# Patient Record
Sex: MALE | Race: WHITE | NOT HISPANIC OR LATINO | Employment: FULL TIME | URBAN - METROPOLITAN AREA
[De-identification: names, ages, dates, MRNs, and addresses within clinical notes are randomized per-mention and may not be internally consistent; named-entity substitution may affect disease eponyms.]

---
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
## 2021-01-15 ENCOUNTER — VIRTUAL VISIT (OUTPATIENT)
Dept: ENDOCRINOLOGY | Facility: CLINIC | Age: 21
End: 2021-01-15
Payer: COMMERCIAL

## 2021-01-15 VITALS — BODY MASS INDEX: 26.74 KG/M2 | WEIGHT: 175 LBS

## 2021-01-15 DIAGNOSIS — E06.3 HYPOTHYROIDISM DUE TO HASHIMOTO'S THYROIDITIS: Primary | ICD-10-CM

## 2021-01-15 PROCEDURE — 99214 OFFICE O/P EST MOD 30 MIN: CPT | Mod: 95 | Performed by: NURSE PRACTITIONER

## 2021-01-15 NOTE — PATIENT INSTRUCTIONS
Thank you for choosing [facility]. It was a pleasure to see you for your office visit today.     If you have any questions or scheduling needs during regular office hours, please call our [place] clinic: [phone]   If urgent concerns arise after hours, you can call [phone] and ask to speak to the pediatric specialist on call.   If you need to schedule Radiology tests, please call: [phone]  My Chart messages are for routine communication and questions and are usually answered within 48-72 hours. If you have an urgent concern or require sooner response, please call us.  Outside lab and imaging results should be faxed to [phone].  If you go to a lab outside of [facility] we will not automatically get those results. You will need to ask to have them faxed.       If you had any blood work, imaging or other tests completed today:  Normal test results will be mailed to your home address in a letter.  Abnormal results will be communicated to you via phone call/letter.  Please allow up to 1-2 weeks for processing and interpretation of most lab work.    1.  No specific concerns on present levothyroxine dosage.    2.  I recommend thyroid lab check in 1-4 weeks with lab appointment.   3.  Endocrine follow up in 1 year.  4.  Contact me with any concerns prior to next visit.

## 2021-01-15 NOTE — PROGRESS NOTES
Edmund is a 20 year old who is being evaluated via a billable video visit.      How would you like to obtain your AVS? Mail a copy  If the video visit is dropped, the invitation should be resent by: Text to cell phone: [phone]  Will anyone else be joining your video visit? No    Video Start Time: 9:19 am  Video-Visit Details    Type of service:  Video Visit    Video End Time:9:26 am    Originating Location (pt. Location): Home    Distant Location (provider location):  [facility] [place]     Platform used for Video Visit: [facility]          Pediatric Endocrinology Follow Up Consultation    Patient: Edmund Beasley MRN# [id number]   YOB: 2000 Age: 20 year old   Date of Visit: Bud 15, 2021    Dear Referring Provider:    I had the pleasure of seeing your patient, Edmund Beasley in the Pediatric Endocrinology Clinic, [facility], on Bud 15, 2021 for follow up consultation regarding abnormal thyroid function testing.           Problem list:     Patient Active Problem List    Diagnosis Date Noted     Intermittent asthma 08/07/2014     Priority: Medium     Was more persistent in fall/winter 2013 - used Symbicort and needed oral steroids for a couple of months        AR (allergic rhinitis) 11/19/2013     Priority: Medium            HPI:   Edmund is a 20 year old  male participating in a video visit in follow up regarding Hashimoto's hypothyroidism.  Edmund was last seen in endocrine clinic virtually on 3/17/2020.      Previous history is reviewed:  Edmund was in for concerns of heart palpitations on 2/19/2019.   Holter monitor placed and normal.  He had thyroid labs performed which showed a suppressed TSH and mildly elevated Free T4 of 1.47.  Also noted muscle twitching at time.  We repeated thyroid labs at initial consultation with thyroid antibodies and Edmund's TSH had risen further to 9.24 with a mildly low Free T4 of 0.75.  Thyroid antibodies were positive  indicative of Hashimoto's hypothyroidism as cause.     Based on results, treatment with levothyroxine was recommend at 75 mcg daily.    Current history:  Edmund reports being well since our last visit.  No issues with sleep.  Sometimes tired but not persistently fatigued.  No abdominal pain, diarrhea, constipation.  No excessive dry skin.  No cold intolerance.  He has noted weight gain since last visit but attributes to eating more with Covid 19 pandemic.      I have reviewed the available past laboratory evaluations, imaging studies, and medical records available to me at this visit.     History was obtained from patient, and electronic health record.           Past Medical History:     Past Medical History:   Diagnosis Date     Moderate persistent asthma 11/19/2013     Unspecified asthma(493.90)             Past Surgical History:   No past surgical history on file.            Social History:     Social History     Social History Narrative    Graduated high school spring 2020.  Currently working.  Edmund lives at home with his father, step-mother, step grandmother, and 2 sisters.        Reviewed and as above.         Family History:   Father is  5 feet 9 inches tall.  Mother is  5 feet tall.       No family history on file.    History of:  Adrenal insufficiency: none.  Autoimmune disease: none.  Calcium problems: none.  Delayed puberty: none.  Diabetes mellitus: none.  Early puberty: none.  Genetic disease: none.  Short stature: none.  Thyroid disease: none.         Allergies:   No Known Allergies          Medications:     Current Outpatient Medications   Medication Sig Dispense Refill     levothyroxine (SYNTHROID/LEVOTHROID) 50 MCG tablet Take 1 tablet (50 mcg) by mouth daily 90 tablet 0             Review of Systems:   Gen: See HPI  Eye: Negative  ENT: Negative  Pulmonary:  Negative  Cardio: See HPI  Gastrointestinal: Negative  Hematologic: Negative  Genitourinary: Negative  Musculoskeletal: Negative  Psychiatric:  See HPI  Neurologic: Negative  Skin: Negative  Endocrine: see HPI.            Physical Exam:   Weight 79.4 kg (175 lb).  Blood pressure percentiles are not available for patients who are 18 years or older.  Height: 0 cm   No height on file for this encounter.  Weight: 79.4 kg (actual weight), 79 %ile (Z= 0.79) based on CDC (Boys, 2-20 Years) weight-for-age data using vitals from 1/15/2021.  BMI: Body mass index is 26.74 kg/m . 87 %ile (Z= 1.10) based on CDC (Boys, 2-20 Years) BMI-for-age data using weight from 1/15/2021 and height from 8/9/2019.      Constitutional: awake, alert, cooperative, no apparent distress          Laboratory results:     TSH   Date Value Ref Range Status   09/18/2020 1.55 0.40 - 4.00 mU/L Final     T4 Free   Date Value Ref Range Status   09/18/2020 1.17 0.76 - 1.46 ng/dL Final            Assessment and Plan:   Edmund is a 20 year old male with Hashimoto's hypothyroidism.    We reviewed last thyroid lab testing which was normal on present levothyroxine dosage.  I recommend repeat thyroid labs locally with lab appointment within next 1-4 weeks.      Endocrine follow up in 1 year recommended.       Orders Placed This Encounter   Procedures     TSH     T4 free       PLAN:  Patient Instructions     Thank you for choosing [facility]. It was a pleasure to see you for your office visit today.     If you have any questions or scheduling needs during regular office hours, please call our [place] clinic: [phone]   If urgent concerns arise after hours, you can call [phone] and ask to speak to the pediatric specialist on call.   If you need to schedule Radiology tests, please call: [phone]  My Chart messages are for routine communication and questions and are usually answered within 48-72 hours. If you have an urgent concern or require sooner response, please call us.  Outside lab and imaging results should be faxed to [phone].  If you go to a lab outside of [facility]  we will not automatically get those results. You will need to ask to have them faxed.       If you had any blood work, imaging or other tests completed today:  Normal test results will be mailed to your home address in a letter.  Abnormal results will be communicated to you via phone call/letter.  Please allow up to 1-2 weeks for processing and interpretation of most lab work.    1.  No specific concerns on present levothyroxine dosage.    2.  I recommend thyroid lab check in 1-4 weeks with lab appointment.   3.  Endocrine follow up in 1 year.  4.  Contact me with any concerns prior to next visit.      Thank you for allowing me to participate in the care of your patient.  Please do not hesitate to call with questions or concerns.    Sincerely,    AMARA Stone, CNP  Pediatric Endocrinology  [facility]  [facility]  [phone]      Chart review today: 5 min  Documentation time today: 5 min

## 2021-01-15 NOTE — LETTER
1/15/2021         RE: Edmund Beasley  [address] [address] [address]        Dear Colleague,    Thank you for referring your patient, Edmund Beasley, to the [facility]. Please see a copy of my visit note below.    Edmund is a 20 year old who is being evaluated via a billable video visit.      How would you like to obtain your AVS? Mail a copy  If the video visit is dropped, the invitation should be resent by: Text to cell phone: [phone]  Will anyone else be joining your video visit? No    Video Start Time: 9:19 am  Video-Visit Details    Type of service:  Video Visit    Video End Time:9:26 am    Originating Location (pt. Location): Home    Distant Location (provider location):  [facility]     Platform used for Video Visit: [facility]          Pediatric Endocrinology Follow Up Consultation    Patient: Edmund Beasley MRN# [id number]   YOB: 2000 Age: 20 year old   Date of Visit: Bud 15, 2021    Dear Referring Provider:    I had the pleasure of seeing your patient, Edmund Beasley in the Pediatric Endocrinology Clinic, [facility], on Bud 15, 2021 for follow up consultation regarding abnormal thyroid function testing.           Problem list:     Patient Active Problem List    Diagnosis Date Noted     Intermittent asthma 08/07/2014     Priority: Medium     Was more persistent in fall/winter 2013 - used Symbicort and needed oral steroids for a couple of months        AR (allergic rhinitis) 11/19/2013     Priority: Medium            HPI:   Edmund is a 20 year old  male participating in a video visit in follow up regarding Hashimoto's hypothyroidism.  Edmund was last seen in endocrine clinic virtually on 3/17/2020.      Previous history is reviewed:  Edmund was in for concerns of heart palpitations on 2/19/2019.   Holter monitor placed and normal.  He had thyroid labs performed which showed a suppressed TSH  and mildly elevated Free T4 of 1.47.  Also noted muscle twitching at time.  We repeated thyroid labs at initial consultation with thyroid antibodies and Edmund's TSH had risen further to 9.24 with a mildly low Free T4 of 0.75.  Thyroid antibodies were positive indicative of Hashimoto's hypothyroidism as cause.     Based on results, treatment with levothyroxine was recommend at 75 mcg daily.    Current history:  Edmund reports being well since our last visit.  No issues with sleep.  Sometimes tired but not persistently fatigued.  No abdominal pain, diarrhea, constipation.  No excessive dry skin.  No cold intolerance.  He has noted weight gain since last visit but attributes to eating more with Covid 19 pandemic.      I have reviewed the available past laboratory evaluations, imaging studies, and medical records available to me at this visit.     History was obtained from patient, and electronic health record.           Past Medical History:     Past Medical History:   Diagnosis Date     Moderate persistent asthma 11/19/2013     Unspecified asthma(493.90)             Past Surgical History:   No past surgical history on file.            Social History:     Social History     Social History Narrative    Graduated high school spring 2020.  Currently working.  Edmund lives at home with his father, step-mother, step grandmother, and 2 sisters.        Reviewed and as above.         Family History:   Father is  5 feet 9 inches tall.  Mother is  5 feet tall.       No family history on file.    History of:  Adrenal insufficiency: none.  Autoimmune disease: none.  Calcium problems: none.  Delayed puberty: none.  Diabetes mellitus: none.  Early puberty: none.  Genetic disease: none.  Short stature: none.  Thyroid disease: none.         Allergies:   No Known Allergies          Medications:     Current Outpatient Medications   Medication Sig Dispense Refill     levothyroxine (SYNTHROID/LEVOTHROID) 50 MCG tablet Take 1 tablet (50 mcg) by  mouth daily 90 tablet 0             Review of Systems:   Gen: See HPI  Eye: Negative  ENT: Negative  Pulmonary:  Negative  Cardio: See HPI  Gastrointestinal: Negative  Hematologic: Negative  Genitourinary: Negative  Musculoskeletal: Negative  Psychiatric: See HPI  Neurologic: Negative  Skin: Negative  Endocrine: see HPI.            Physical Exam:   Weight 79.4 kg (175 lb).  Blood pressure percentiles are not available for patients who are 18 years or older.  Height: 0 cm   No height on file for this encounter.  Weight: 79.4 kg (actual weight), 79 %ile (Z= 0.79) based on CDC (Boys, 2-20 Years) weight-for-age data using vitals from 1/15/2021.  BMI: Body mass index is 26.74 kg/m . 87 %ile (Z= 1.10) based on CDC (Boys, 2-20 Years) BMI-for-age data using weight from 1/15/2021 and height from 8/9/2019.      Constitutional: awake, alert, cooperative, no apparent distress          Laboratory results:     TSH   Date Value Ref Range Status   09/18/2020 1.55 0.40 - 4.00 mU/L Final     T4 Free   Date Value Ref Range Status   09/18/2020 1.17 0.76 - 1.46 ng/dL Final            Assessment and Plan:   Edmund is a 20 year old male with Hashimoto's hypothyroidism.    We reviewed last thyroid lab testing which was normal on present levothyroxine dosage.  I recommend repeat thyroid labs locally with lab appointment within next 1-4 weeks.      Endocrine follow up in 1 year recommended.       Orders Placed This Encounter   Procedures     TSH     T4 free       PLAN:  Patient Instructions     Thank you for choosing [facility]. It was a pleasure to see you for your office visit today.     If you have any questions or scheduling needs during regular office hours, please call our [place] clinic: [phone]   If urgent concerns arise after hours, you can call [phone] and ask to speak to the pediatric specialist on call.   If you need to schedule Radiology tests, please call: [phone]  My Chart messages are for routine  communication and questions and are usually answered within 48-72 hours. If you have an urgent concern or require sooner response, please call us.  Outside lab and imaging results should be faxed to [phone].  If you go to a lab outside of [facility] we will not automatically get those results. You will need to ask to have them faxed.       If you had any blood work, imaging or other tests completed today:  Normal test results will be mailed to your home address in a letter.  Abnormal results will be communicated to you via phone call/letter.  Please allow up to 1-2 weeks for processing and interpretation of most lab work.    1.  No specific concerns on present levothyroxine dosage.    2.  I recommend thyroid lab check in 1-4 weeks with lab appointment.   3.  Endocrine follow up in 1 year.  4.  Contact me with any concerns prior to next visit.      Thank you for allowing me to participate in the care of your patient.  Please do not hesitate to call with questions or concerns.    Sincerely,    AMARA Stone, CNP  Pediatric Endocrinology  [facility]  [facility]  [phone]      Chart review today: 5 min  Documentation time today: 5 min        Again, thank you for allowing me to participate in the care of your patient.        Sincerely,        AMARA Gutiérrez CNP

## 2021-01-22 ENCOUNTER — TELEPHONE (OUTPATIENT)
Dept: FAMILY MEDICINE | Facility: CLINIC | Age: 21
End: 2021-01-22

## 2021-01-22 ENCOUNTER — OFFICE VISIT (OUTPATIENT)
Dept: FAMILY MEDICINE | Facility: CLINIC | Age: 21
End: 2021-01-22
Payer: COMMERCIAL

## 2021-01-22 ENCOUNTER — NURSE TRIAGE (OUTPATIENT)
Dept: FAMILY MEDICINE | Facility: CLINIC | Age: 21
End: 2021-01-22

## 2021-01-22 VITALS
BODY MASS INDEX: 26.67 KG/M2 | HEART RATE: 99 BPM | WEIGHT: 176 LBS | SYSTOLIC BLOOD PRESSURE: 128 MMHG | OXYGEN SATURATION: 97 % | HEIGHT: 68 IN | RESPIRATION RATE: 16 BRPM | DIASTOLIC BLOOD PRESSURE: 80 MMHG | TEMPERATURE: 98.7 F

## 2021-01-22 DIAGNOSIS — Z23 ENCOUNTER FOR IMMUNIZATION: ICD-10-CM

## 2021-01-22 DIAGNOSIS — S91.332A PUNCTURE WOUND OF LEFT FOOT EXCLUDING TOES WITHOUT COMPLICATION, INITIAL ENCOUNTER: Primary | ICD-10-CM

## 2021-01-22 PROCEDURE — 90715 TDAP VACCINE 7 YRS/> IM: CPT | Performed by: NURSE PRACTITIONER

## 2021-01-22 PROCEDURE — 99213 OFFICE O/P EST LOW 20 MIN: CPT | Mod: 25 | Performed by: NURSE PRACTITIONER

## 2021-01-22 PROCEDURE — 90471 IMMUNIZATION ADMIN: CPT | Performed by: NURSE PRACTITIONER

## 2021-01-22 RX ORDER — MUPIROCIN CALCIUM 20 MG/G
CREAM TOPICAL 3 TIMES DAILY
Qty: 15 G | Refills: 0 | Status: SHIPPED | OUTPATIENT
Start: 2021-01-22 | End: 2021-02-01

## 2021-01-22 RX ORDER — BACITRACIN ZINC 500 [USP'U]/G
OINTMENT TOPICAL 2 TIMES DAILY
Qty: 14 G | Refills: 0 | Status: SHIPPED | OUTPATIENT
Start: 2021-01-22 | End: 2021-02-01

## 2021-01-22 ASSESSMENT — MIFFLIN-ST. JEOR: SCORE: 1783.86

## 2021-01-22 ASSESSMENT — ENCOUNTER SYMPTOMS
WOUND: 1
CONSTITUTIONAL NEGATIVE: 1

## 2021-01-22 NOTE — TELEPHONE ENCOUNTER
Bacitracin sent to pharmacy. I instructed him to purchase an over the counter antibiotic ointment if insurance didn't cover Bactroban.     Thanks,  Cinthya Irvin DNP, NP-C 1/22/2021 3:46 PM

## 2021-01-22 NOTE — TELEPHONE ENCOUNTER
mupirocin (BACTROBAN) 2 % external cream     Pharmacy does not have it.    Rizwana Jacobo on 1/22/2021 at 3:03 PM

## 2021-01-22 NOTE — PROGRESS NOTES
Prior to immunization administration, verified patients identity using patient s name and date of birth. Please see Immunization Activity for additional information.     Screening Questionnaire for Adult Immunization    Are you sick today?   No   Do you have allergies to medications, food, a vaccine component or latex?   No   Have you ever had a serious reaction after receiving a vaccination?   No   Do you have a long-term health problem with heart, lung, kidney, or metabolic disease (e.g., diabetes), asthma, a blood disorder, no spleen, complement component deficiency, a cochlear implant, or a spinal fluid leak?  Are you on long-term aspirin therapy?   No   Do you have cancer, leukemia, HIV/AIDS, or any other immune system problem?   No   Do you have a parent, brother, or sister with an immune system problem?   No   In the past 3 months, have you taken medications that affect  your immune system, such as prednisone, other steroids, or anticancer drugs; drugs for the treatment of rheumatoid arthritis, Crohn s disease, or psoriasis; or have you had radiation treatments?   No   Have you had a seizure, or a brain or other nervous system problem?   No   During the past year, have you received a transfusion of blood or blood    products, or been given immune (gamma) globulin or antiviral drug?   No   For women: Are you pregnant or is there a chance you could become       pregnant during the next month?   No   Have you received any vaccinations in the past 4 weeks?   No     Immunization questionnaire answers were all negative.        Per orders of Cinthya Irvin, injection of Adacel given by Stacy Miller CMA. Patient instructed to remain in clinic for 15 minutes afterwards, and to report any adverse reaction to me immediately.       Screening performed by Stacy Miller CMA on 1/22/2021 at 2:07 PM.

## 2021-01-22 NOTE — PROGRESS NOTES
"  Assessment & Plan     Puncture wound of left foot excluding toes without complication, initial encounter  Small puncture wound to left plantar side of foot by melly nail in garage. No signs of infection at this time. Last Tdap was 2014 (>5 years). Here for booster. Bactroban prescribed as patient doesn't have any abx ointments at home. Side effects, risks and benefits of medication were discussed with patient. Discussed how and when to take medication. Discussed s/s consistent with infection that would warrant further evaluation.     - TDAP VACCINE (Adacel, Boostrix)  [[id number]]  - mupirocin (BACTROBAN) 2 % external cream; Apply topically 3 times daily for 10 days    Encounter for immunization  Tdap given.     - TDAP VACCINE (Adacel, Boostrix)  [[id number]]         BMI:   Estimated body mass index is 26.96 kg/m  as calculated from the following:    Height as of this encounter: 1.721 m (5' 7.75\").    Weight as of this encounter: 79.8 kg (176 lb).         Patient Instructions   Tdap vaccine given today.     Use antibiotic cream 2-3 times a day to the puncture site. Monitor for signs of infection: increasing pain, surrounding redness, swelling, fever. Follow-up if you notice any of these symptoms.         Return if symptoms worsen or fail to improve.    AMARA Sanchez [facility]    Houston Shaffer is a 20 year old who presents to clinic today for the following health issues  accompanied by himself:    HPI       Concern - Stepped on a melly nail.  Onset: 11:30 am today.  Description: Stepped on a melly nail, left foot.  He was at home working in his garage.  Last Tetanus injection was on 8-7-2014.  Intensity: mild-only where there is a pressure feeling on the area.  Progression of Symptoms:  improving  Accompanying Signs & Symptoms: There was redness around the area after stepping on the nail.  Previous history of similar problem: None  Precipitating factors:        Worsened by: Just " "if there is a pressure feeling on the area.  Wearing and walking in his boot is fine.  Alleviating factors:        Improved by: None  Therapies tried and outcome: None.    Additional provider notes: Stepped on melly nail as he stepped backward in garage today. Had thick soled shoes on, but still has small puncture wound to plantar side of left foot. No retained foreign body that he is aware of.     Review of Systems   Constitutional: Negative.    Skin: Positive for wound (left bottom of foot puncture wound).            Objective    /80   Pulse 99   Temp 98.7  F (37.1  C) (Tympanic)   Resp 16   Ht 1.721 m (5' 7.75\")   Wt 79.8 kg (176 lb)   SpO2 97%   BMI 26.96 kg/m    Body mass index is 26.96 kg/m .  Physical Exam  Vitals signs and nursing note reviewed.   Constitutional:       General: He is not in acute distress.     Appearance: Normal appearance. He is not ill-appearing or toxic-appearing.   Musculoskeletal:        Feet:    Neurological:      Mental Status: He is alert.                "

## 2021-01-22 NOTE — PATIENT INSTRUCTIONS
Tdap vaccine given today.     Use antibiotic cream 2-3 times a day to the puncture site. Monitor for signs of infection: increasing pain, surrounding redness, swelling, fever. Follow-up if you notice any of these symptoms.

## 2021-01-22 NOTE — TELEPHONE ENCOUNTER
Crystal, could you send an alternative to the Bactroban, pharmacy does not carry it.    Provider please review and advise. Thank you.

## 2021-01-22 NOTE — TELEPHONE ENCOUNTER
Disposition:  Visit with provider within 24 hours for evaluation of L foot puncture wound and tetanus booster, as last one was >5 years ago.  Scheduled with provider in clinic this afternoon.  Patient denies any recent COVID-19 exposure or symptoms.     Additional Information    Negative: Serious injury with multiple fractures    Negative: [1] Major bleeding (e.g., actively dripping or spurting) AND [2] can't be stopped    Negative: Amputation    Negative: Looks like a dislocated joint (very crooked or deformed)    Negative: Sounds like a life-threatening emergency to the triager    Negative: Wound looks infected    Negative: Caused by an animal bite    Negative: Caused by a human bite    Puncture wound of foot    Negative: [1] Puncture wound of head, neck, chest, back, or abdomen AND [2] sounds life-threatening to the triager    Negative: Shock suspected (e.g., cold/pale/clammy skin, too weak to stand, low BP, rapid pulse)    Negative: Sounds like a life-threatening emergency to the triager    Negative: [1] Caused by a needlestick or other sharp object AND [2] possible exposure to another person's body fluids    Negative: Caused by an animal bite    Negative: Caused by a human bite    Negative: Skin is cut or scraped, not punctured    Negative: Puncture wound of eye or eyelid    Negative: Foreign body is still in the skin (e.g., splinter, sliver, fishhook)    Negative: [1] Puncture wound of head, neck, chest, abdomen, or overlying a joint AND [2] could be deep    Negative: High pressure injection injury (e.g., from grease gun or paint gun, usually work-related)    Negative: Sounds like a serious injury to the triager    Negative: [1] SEVERE pain AND [2] not improved 2 hours after pain medicine    Negative: [1] Puncture wound of foot AND [2] hurts too much to walk on it (i.e., unable to bear weight, severe limp)    Negative: [1] Puncture wound of bare foot (no shoes) AND [2] setting was dirty  (Exception: shallow  "puncture)    Negative: [1] Puncture wound of finger AND [2] entire finger swollen    Negative: Puncture wound from a sharp object that was very dirty    Negative: [1] Dirt in the wound AND [2] not removed with 15 minutes of scrubbing    Negative: Sensation of something still in the wound  (i.e., retained foreign body in wound)    Negative: Tip of the object is broken off and missing (e.g., pencil point)    Negative: [1] Red area or streak AND [2] fever    Negative: [1] Looks infected AND [2] large red area (> 1 in. or 2.5 cm)    Negative: [1] Looks infected (red area or pus) AND [2] no fever    Negative: [1] Pain or swelling AND [2] present > 5 days    Negative: No prior tetanus shots (or is not fully vaccinated)    [1] Last tetanus shot > 5 years ago AND [2] DIRTY puncture (e.g., object OR skin was dirty, objects on ground/floor)    Answer Assessment - Initial Assessment Questions  1. MECHANISM: \"How did the injury happen?\" (e.g., twisting injury, direct blow)       Stepped on a melly nail with L foot  2. ONSET: \"When did the injury happen?\" (Minutes or hours ago)       1 hour ago  3. LOCATION: \"Where is the injury located?\"       See above  4. APPEARANCE of INJURY: \"What does the injury look like?\"       Can see puncture, but not bleeding  5. WEIGHT-BEARING: \"Can you put weight on that foot?\" \"Can you walk (four steps or more)?\"        Yes  6. SIZE: For cuts, bruises, or swelling, ask: \"How large is it?\" (e.g., inches or centimeters;  entire joint)       Small, puncture  7. PAIN: \"Is there pain?\" If so, ask: \"How bad is the pain?\"    (e.g., Scale 1-10; or mild, moderate, severe)      1/10  8. TETANUS: For any breaks in the skin, ask: \"When was the last tetanus booster?\"      Yes, 2014  9. OTHER SYMPTOMS: \"Do you have any other symptoms?\"       No  10. PREGNANCY: \"Is there any chance you are pregnant?\" \"When was your last menstrual period?\"        NA    Answer Assessment - Initial Assessment Questions  1. LOCATION: " "\"Where is the puncture located?\"       L foot  2. OBJECT: \"What was the object that punctured the skin?\"       [place] nail  3. DEPTH: \"How deep do you think the puncture goes?\"       Doesn't think it's too deep, as it was through his shoe and didn't bleed much at all. He can see the puncture though.    4. ONSET: \"When did the injury occur?\" (Minutes or hours)      One hour ago  5. PAIN: \"Is it painful?\" If so, ask: \"How bad is the pain?\"  (Scale 1-10; or mild, moderate, severe)      Rates 1/10  6. TETANUS: \"When was the last tetanus booster?\"      2014, so needs booster  7. PREGNANCY: \"Is there any chance you are pregnant?\" \"When was your last menstrual period?\"      NA    Protocols used: FOOT AND ANKLE INJURY-A-, PUNCTURE WOUND-A-    Tamia El RN  [facility]      "

## 2021-01-23 ASSESSMENT — ASTHMA QUESTIONNAIRES: ACT_TOTALSCORE: 25

## 2021-02-05 DIAGNOSIS — E06.3 HYPOTHYROIDISM DUE TO HASHIMOTO'S THYROIDITIS: ICD-10-CM

## 2021-02-05 LAB
T4 FREE SERPL-MCNC: 1.23 NG/DL (ref 0.76–1.46)
TSH SERPL DL<=0.005 MIU/L-ACNC: 2.2 MU/L (ref 0.4–4)

## 2021-02-05 PROCEDURE — 84439 ASSAY OF FREE THYROXINE: CPT | Performed by: NURSE PRACTITIONER

## 2021-02-05 PROCEDURE — 84443 ASSAY THYROID STIM HORMONE: CPT | Performed by: NURSE PRACTITIONER

## 2021-02-05 PROCEDURE — 36415 COLL VENOUS BLD VENIPUNCTURE: CPT | Performed by: NURSE PRACTITIONER

## 2021-02-12 DIAGNOSIS — E06.3 HYPOTHYROIDISM DUE TO HASHIMOTO'S THYROIDITIS: ICD-10-CM

## 2021-02-12 RX ORDER — LEVOTHYROXINE SODIUM 50 UG/1
50 TABLET ORAL DAILY
Qty: 90 TABLET | Refills: 3 | Status: SHIPPED | OUTPATIENT
Start: 2021-02-12 | End: 2022-03-07

## 2021-02-13 ENCOUNTER — HEALTH MAINTENANCE LETTER (OUTPATIENT)
Age: 21
End: 2021-02-13

## 2021-09-19 ENCOUNTER — HEALTH MAINTENANCE LETTER (OUTPATIENT)
Age: 21
End: 2021-09-19

## 2022-03-05 ENCOUNTER — HEALTH MAINTENANCE LETTER (OUTPATIENT)
Age: 22
End: 2022-03-05

## 2022-03-07 DIAGNOSIS — E06.3 HYPOTHYROIDISM DUE TO HASHIMOTO'S THYROIDITIS: ICD-10-CM

## 2022-03-07 NOTE — TELEPHONE ENCOUNTER
Per Epic, patient last seen 1/2021.  Patient was called and voicemail was left that refill request has been seen to provide for review as it has been over one year since last appointment.  Itzel Griffin RN

## 2022-03-07 NOTE — TELEPHONE ENCOUNTER
Health Call Center    Phone Message    May a detailed message be left on voicemail: yes     Reason for Call: Medication Refill Request    Has the patient contacted the pharmacy for the refill? Yes   Name of medication being requested: Levothyroxine  Provider who prescribed the medication: Jada Martinez  Pharmacy: [id number] [address] AT [facility] [address]  Date medication is needed: Today.  The patient is out of the medication.  Scheduled visit for 4/22/2022.         Action Taken: Message routed to:  Pediatric Clinics: Endocrinology p [id number]    Travel Screening: Not Applicable

## 2022-03-08 RX ORDER — LEVOTHYROXINE SODIUM 50 UG/1
50 TABLET ORAL DAILY
Qty: 30 TABLET | Refills: 1 | Status: SHIPPED | OUTPATIENT
Start: 2022-03-08 | End: 2022-04-22

## 2022-03-08 NOTE — TELEPHONE ENCOUNTER
Refill approved by AMARA Stone CNP.    Message left on patient voicemail informing him of refill approval.    Kinza Archuleta RN  Adult and Pediatric Endocrinology   [facility]

## 2022-04-18 ENCOUNTER — LAB (OUTPATIENT)
Dept: LAB | Facility: CLINIC | Age: 22
End: 2022-04-18
Payer: COMMERCIAL

## 2022-04-18 DIAGNOSIS — E06.3 HYPOTHYROIDISM DUE TO HASHIMOTO'S THYROIDITIS: ICD-10-CM

## 2022-04-18 LAB
HOLD SPECIMEN: NORMAL
T4 FREE SERPL-MCNC: 1.11 NG/DL (ref 0.76–1.46)
TSH SERPL DL<=0.005 MIU/L-ACNC: 3.19 MU/L (ref 0.4–4)

## 2022-04-18 PROCEDURE — 84439 ASSAY OF FREE THYROXINE: CPT

## 2022-04-18 PROCEDURE — 84443 ASSAY THYROID STIM HORMONE: CPT

## 2022-04-18 PROCEDURE — 36415 COLL VENOUS BLD VENIPUNCTURE: CPT

## 2022-04-22 ENCOUNTER — VIRTUAL VISIT (OUTPATIENT)
Dept: ENDOCRINOLOGY | Facility: CLINIC | Age: 22
End: 2022-04-22
Payer: COMMERCIAL

## 2022-04-22 DIAGNOSIS — E06.3 HYPOTHYROIDISM DUE TO HASHIMOTO'S THYROIDITIS: ICD-10-CM

## 2022-04-22 PROCEDURE — 99213 OFFICE O/P EST LOW 20 MIN: CPT | Mod: 95 | Performed by: NURSE PRACTITIONER

## 2022-04-22 RX ORDER — LEVOTHYROXINE SODIUM 50 UG/1
50 TABLET ORAL DAILY
Qty: 90 TABLET | Refills: 3 | Status: SHIPPED | OUTPATIENT
Start: 2022-04-22 | End: 2023-03-29

## 2022-04-22 NOTE — PATIENT INSTRUCTIONS
Thank you for choosing [facility]. It was a pleasure to see you for your office visit today.     If you have any questions or scheduling needs during regular office hours, please call our [place] clinic: [phone]   If urgent concerns arise after hours, you can call [phone] and ask to speak to the pediatric specialist on call.   If you need to schedule Radiology tests, please call: [phone]  My Chart messages are for routine communication and questions and are usually answered within 48-72 hours. If you have an urgent concern or require sooner response, please call us.  Outside lab and imaging results should be faxed to [phone].  If you go to a lab outside of [facility] we will not automatically get those results. You will need to ask to have them faxed.       If you had any blood work, imaging or other tests completed today:  Normal test results will be mailed to your home address in a letter.  Abnormal results will be communicated to you via phone call/letter.  Please allow up to 1-2 weeks for processing and interpretation of most lab work.     We reviewed results of recent thyroid lab testing as follows:  Lab on 04/18/2022   Component Date Value Ref Range Status    Hold Specimen 04/18/2022 [facility]   Final    Free T4 04/18/2022 1.11  0.76 - 1.46 ng/dL Final    TSH 04/18/2022 3.19  0.40 - 4.00 mU/L Final    Results were normal.  2.  I recommend continuing on levothyroxine at 50 mcg daily.  3.  Follow up in 1 year with adult endocrine.

## 2022-04-22 NOTE — LETTER
4/22/2022         RE: Edmund Beasley  [id number] [facility] [address]        Dear Colleague,    Thank you for referring your patient, Edmund Beasley, to the [facility] [place]. Please see a copy of my visit note below.      Pediatric Endocrinology Follow Up Consultation    Patient: Edmund Beasley MRN# [id number]   YOB: 2000 Age: 21 year old   Date of Visit: Apr 22, 2022    Dear Referring Provider:    I had the pleasure of seeing your patient, Edmund Beasley in the Pediatric Endocrinology Clinic, [facility], on Apr 22, 2022 for follow up consultation regarding Hashimoto's hypothyroidism.           Problem list:     Patient Active Problem List    Diagnosis Date Noted     Intermittent asthma 08/07/2014     Priority: Medium     Was more persistent in fall/winter 2013 - used Symbicort and needed oral steroids for a couple of months        AR (allergic rhinitis) 11/19/2013     Priority: Medium            HPI:   Edmund is a 21 year old  male participating in a video visit in follow up regarding Hashimoto's hypothyroidism.  Edmund was last seen in endocrine clinic virtually on 1/15/2021.      Previous history is reviewed:  Edmund was in for concerns of heart palpitations on 2/19/2019.   Holter monitor placed and normal.  He had thyroid labs performed which showed a suppressed TSH and mildly elevated Free T4 of 1.47.  Also noted muscle twitching at time.  We repeated thyroid labs at initial consultation with thyroid antibodies and Edmund's TSH had risen further to 9.24 with a mildly low Free T4 of 0.75.  Thyroid antibodies were positive indicative of Hashimoto's hypothyroidism as cause.   Based on results, treatment with levothyroxine was recommend at 75 mcg daily.    Current history:  Edmund reports being well since our last visit.  He ran out of levothyroxine last month prior to a vacation and was off treatment for 2 weeks.  He felt symptoms of feeling in a fog  essentially when off.  He has now resumed consistent use and reports being back to normal.    He is receiving 50 mcg daily.  Last labs 4/18/2022 were reviewed and normal.  No issues with sleep.  No unexplained fatigue.  No abdominal pain, diarrhea, constipation.  No excessive dry skin.  No cold intolerance.  He is back to working out and weight is noted as stable now.     I have reviewed the available past laboratory evaluations, imaging studies, and medical records available to me at this visit.     History was obtained from patient, and electronic health record.           Past Medical History:     Past Medical History:   Diagnosis Date     Moderate persistent asthma 11/19/2013     Unspecified asthma(493.90)             Past Surgical History:   No past surgical history on file.            Social History:     Social History     Social History Narrative    Graduated high school spring 2020.  Currently working.  Emdund lives at home with his father, step-mother, step grandmother, and 2 sisters.        Reviewed and as above.         Family History:   Father is  5 feet 9 inches tall.  Mother is  5 feet tall.       No family history on file.    History of:  Adrenal insufficiency: none.  Autoimmune disease: none.  Calcium problems: none.  Delayed puberty: none.  Diabetes mellitus: none.  Early puberty: none.  Genetic disease: none.  Short stature: none.  Thyroid disease: none.         Allergies:   No Known Allergies          Medications:     Current Outpatient Medications   Medication Sig Dispense Refill     levothyroxine (SYNTHROID/LEVOTHROID) 50 MCG tablet Take 1 tablet (50 mcg) by mouth daily 30 tablet 1             Review of Systems:   Gen: See HPI  Eye: Negative  ENT: Negative  Pulmonary:  Negative  Cardio: See HPI  Gastrointestinal: Negative  Hematologic: Negative  Genitourinary: Negative  Musculoskeletal: Negative  Psychiatric: See HPI  Neurologic: Negative  Skin: Negative  Endocrine: see HPI.            Physical  Exam:   There were no vitals taken for this visit.  Growth percentile SmartLinks can only be used for patients less than 20 years old.  Height: 0 cm   Facility age limit for growth percentiles is 20 years.  Weight: Patient weight not available., Facility age limit for growth percentiles is 20 years.  BMI: There is no height or weight on file to calculate BMI. No height and weight on file for this encounter.      Constitutional: awake, alert, cooperative, no apparent distress          Laboratory results:     TSH   Date Value Ref Range Status   04/18/2022 3.19 0.40 - 4.00 mU/L Final   02/05/2021 2.20 0.40 - 4.00 mU/L Final     T4 Free   Date Value Ref Range Status   02/05/2021 1.23 0.76 - 1.46 ng/dL Final     Free T4   Date Value Ref Range Status   04/18/2022 1.11 0.76 - 1.46 ng/dL Final            Assessment and Plan:   Edmund is a 21 year old male with Hashimoto's hypothyroidism.    We reviewed last thyroid lab testing which was normal on present levothyroxine dosage.  I recommend repeat thyroid labs locally with lab appointment within next 1-4 weeks.      Endocrine follow up in 1 year recommended.       No orders of the defined types were placed in this encounter.      PLAN:  There are no Patient Instructions on file for this visit.    Thank you for allowing me to participate in the care of your patient.  Please do not hesitate to call with questions or concerns.    Sincerely,    AMARA Stone, CNP  Pediatric Endocrinology  [facility]  [facility]  [phone]     Video-Visit Details    Type of service:  Video Visit    Video Start Time: 9:52am    End Time (time video stopped): 10:02 am    Originating Location (pt. Location): home    Distant Location (provider location):  PEDIATRIC ENDOCRINOLOGY     Mode of Communication:  Video Conference via thesweetlink      20 minutes spent on the date of the encounter doing chart review, review of test results, interpretation of tests,  patient visit and documentation         Again, thank you for allowing me to participate in the care of your patient.        Sincerely,        AMARA Gutiérrez CNP

## 2022-04-22 NOTE — NURSING NOTE
Edmund is a 21 year old who is being evaluated via a billable video visit.      How would you like to obtain your AVS? Bloglovinhart  If the video visit is dropped, the invitation should be resent by: Text to cell phone: [phone]  Will anyone else be joining your video visit? Lissett Castro MA

## 2022-11-20 ENCOUNTER — HEALTH MAINTENANCE LETTER (OUTPATIENT)
Age: 22
End: 2022-11-20

## 2023-03-16 ENCOUNTER — MYC MEDICAL ADVICE (OUTPATIENT)
Dept: ENDOCRINOLOGY | Facility: CLINIC | Age: 23
End: 2023-03-16
Payer: COMMERCIAL

## 2023-03-16 DIAGNOSIS — E06.3 HYPOTHYROIDISM DUE TO HASHIMOTO'S THYROIDITIS: Primary | ICD-10-CM

## 2023-03-28 ASSESSMENT — ENCOUNTER SYMPTOMS
SKIN CHANGES: 0
PALPITATIONS: 1
HYPOTENSION: 0
POOR WOUND HEALING: 0
LEG PAIN: 0
SLEEP DISTURBANCES DUE TO BREATHING: 0
SYNCOPE: 0
NAIL CHANGES: 0
LIGHT-HEADEDNESS: 0
EXERCISE INTOLERANCE: 0
HYPERTENSION: 0
ORTHOPNEA: 0

## 2023-03-28 NOTE — PROGRESS NOTES
Endocrine Consult note    Attending Assessment/Plan :     Hypothyroidism due to Hashimoto's thyroiditis  Assessment:  -hashimoto initially presented as hyperthyroidism  -not at full weight based replacement dose which may imply he has some residual, but insufficient, gland function.  This could decline over time with dose gradually going up    Plan:  -recheck TFTs to assess dose  -continue 50mcg lt4 unless change indicated by labs  -recheck labs in 3 months if dose is changed today, otherwise recheck in 6 months about 1-2 weeks prior to followup  -discussed taking double dose the following day if he ever misses a dose  -will check TRAb to rule out combo graves'/lorna's which could change monitoring plan  -check vitamin D level     I have independently reviewed and interpreted labs, imaging as indicated.    RTC 6 months with repeat labs 1-2 weeks prior     Chief complaint:  Edmund is a 22 year old male seen in consultation for hypothyroid 2/2 hashimoto       HISTORY OF PRESENT ILLNESS    Edmund is a 22M with minimal PMH referred to endocrine for hypothyroidism.     Diagnosed about 4 years ago. Initial sx were heart palps, fatigue,  No appetite changes, denies diarrhea.   He was found to be hyperthyroid initially but then progressed to hypothyroidism.  TPO was positive.  Initial dosing was too high but once 50mcg maintenance dose was figure out he has remained on that dose stably for the last few years.  Sx have resolved.       Currently on 50mcg levothyroxine daily.  Taking daily on an empty stomach.  Not missing doses except for on rare occasion.  Not taking calcium or iron supplements.     Denies family hx of chronic illness. Not having unintentional weight loss or any GI issues.    Endocrine relevant labs and/or imaging are as follows:  Component      Latest Ref Rng & Units 4/16/2019 9/18/2020 2/5/2021 4/18/2022   Thyroid Peroxidase Antibody      <35 IU/mL 207 (H)      T4 Free      0.76 - 1.46 ng/dL  1.17 1.23 1.11    TSH      0.40 - 4.00 mU/L  1.55 2.20 3.19     REVIEW OF SYSTEMS  Answers for HPI/ROS submitted by the patient on 3/28/2023  General Symptoms: No  Skin Symptoms: Yes  HENT Symptoms: No  EYE SYMPTOMS: No  HEART SYMPTOMS: Yes  LUNG SYMPTOMS: No  INTESTINAL SYMPTOMS: No  URINARY SYMPTOMS: No  REPRODUCTIVE SYMPTOMS: No  SKELETAL SYMPTOMS: No  BLOOD SYMPTOMS: No  NERVOUS SYSTEM SYMPTOMS: No  MENTAL HEALTH SYMPTOMS: No  Changes in hair: No  Changes in moles/birth marks: No  Itching: Yes  Rashes: No  Changes in nails: No  Acne: No  Change in facial hair: No  Warts: No  Non-healing sores: No  Scarring: No  Flaking of skin: No  Color changes of hands/feet in cold : No  Sun sensitivity: No  Skin thickening: No  Chest pain or pressure: No  Fast or irregular heartbeat: Yes  Pain in legs with walking: No  Trouble breathing while lying down: No  Fingers or toes appear blue: No  High blood pressure: No  Low blood pressure: No  Fainting: No  Murmurs: Yes  Pacemaker: No  Varicose veins: No  Edema or swelling: No  Wake up at night with shortness of breath: No  Light-headedness: No  Exercise intolerance: No      10 system ROS otherwise as per the HPI or negative    Past Medical History  Past Medical History:   Diagnosis Date     Moderate persistent asthma 11/19/2013     Unspecified asthma(493.90)        Medications  Current Outpatient Medications   Medication Sig Dispense Refill     levothyroxine (SYNTHROID/LEVOTHROID) 50 MCG tablet Take 1 tablet (50 mcg) by mouth daily 90 tablet 3       Allergies  No Known Allergies      Family History  family history is not on file.    Social History  Social History     Tobacco Use     Smoking status: Never     Smokeless tobacco: Never   Substance Use Topics     Alcohol use: No     Drug use: No       Physical Exam  /68   Pulse 80   Temp 97.4  F (36.3  C) (Tympanic)   There is no height or weight on file to calculate BMI.    Physical Exam    GENERAL :  In no apparent distress  SKIN: Normal  color, normal temperature     EYES: EOMI, No scleral icterus  NECK: No visible masses.    RESP: No respiratory distress, normal effort  CARDIO: regular rate  ABDOMEN: flat, nondistended       NEURO: awake, alert, responds appropriately to questions   EXTREMITIES: No clubbing, cyanosis or edema.    I spent a total of 31 minutes on the day of this new patient visit on chart review, lab review, coordinating care, exam and counseling of patient

## 2023-03-29 ENCOUNTER — OFFICE VISIT (OUTPATIENT)
Dept: ENDOCRINOLOGY | Facility: CLINIC | Age: 23
End: 2023-03-29
Attending: NURSE PRACTITIONER
Payer: COMMERCIAL

## 2023-03-29 VITALS — DIASTOLIC BLOOD PRESSURE: 68 MMHG | TEMPERATURE: 97.4 F | SYSTOLIC BLOOD PRESSURE: 113 MMHG | HEART RATE: 80 BPM

## 2023-03-29 DIAGNOSIS — E06.3 HYPOTHYROIDISM DUE TO HASHIMOTO'S THYROIDITIS: ICD-10-CM

## 2023-03-29 LAB
DEPRECATED CALCIDIOL+CALCIFEROL SERPL-MC: 22 UG/L (ref 20–75)
T3 SERPL-MCNC: 105 NG/DL (ref 85–202)
T4 FREE SERPL-MCNC: 1.49 NG/DL (ref 0.9–1.7)
TSH SERPL DL<=0.005 MIU/L-ACNC: 1.47 UIU/ML (ref 0.3–4.2)

## 2023-03-29 PROCEDURE — 84439 ASSAY OF FREE THYROXINE: CPT | Performed by: INTERNAL MEDICINE

## 2023-03-29 PROCEDURE — 84443 ASSAY THYROID STIM HORMONE: CPT | Performed by: INTERNAL MEDICINE

## 2023-03-29 PROCEDURE — 99000 SPECIMEN HANDLING OFFICE-LAB: CPT | Performed by: INTERNAL MEDICINE

## 2023-03-29 PROCEDURE — 99203 OFFICE O/P NEW LOW 30 MIN: CPT | Performed by: INTERNAL MEDICINE

## 2023-03-29 PROCEDURE — 84480 ASSAY TRIIODOTHYRONINE (T3): CPT | Performed by: INTERNAL MEDICINE

## 2023-03-29 PROCEDURE — 36415 COLL VENOUS BLD VENIPUNCTURE: CPT | Performed by: INTERNAL MEDICINE

## 2023-03-29 PROCEDURE — 83520 IMMUNOASSAY QUANT NOS NONAB: CPT | Mod: 90 | Performed by: INTERNAL MEDICINE

## 2023-03-29 PROCEDURE — 82306 VITAMIN D 25 HYDROXY: CPT | Performed by: INTERNAL MEDICINE

## 2023-03-29 RX ORDER — LEVOTHYROXINE SODIUM 50 UG/1
50 TABLET ORAL DAILY
Qty: 90 TABLET | Refills: 4 | Status: SHIPPED | OUTPATIENT
Start: 2023-03-29 | End: 2023-09-21

## 2023-03-29 NOTE — PATIENT INSTRUCTIONS
Labs today.  Continue 50mcg daily levothyroxine unless labs show we need a dose change.  If you miss any doses, you can double up the next day.     Make a lab appointment to get labs drawn about 1-2 weeks before your next visit so we can discuss the results at your next visit.

## 2023-03-29 NOTE — NURSING NOTE
"Initial Temp 97.4  F (36.3  C) (Tympanic)  Estimated body mass index is 26.96 kg/m  as calculated from the following:    Height as of 1/22/21: 1.721 m (5' 7.75\").    Weight as of 1/22/21: 79.8 kg (176 lb). .    Karen Alexis LPN on 3/29/2023 at 10:04 AM    "

## 2023-03-29 NOTE — LETTER
3/29/2023         RE: Edmund Beasley  [address] [address] [facility] [address] [facility] [address]        Dear Colleague,    Thank you for referring your patient, Edmund Beasley, to the [facility] ENDOCRINOLOGY. Please see a copy of my visit note below.    Endocrine Consult note    Attending Assessment/Plan :     Hypothyroidism due to Hashimoto's thyroiditis  Assessment:  -hashimoto initially presented as hyperthyroidism  -not at full weight based replacement dose which may imply he has some residual, but insufficient, gland function.  This could decline over time with dose gradually going up    Plan:  -recheck TFTs to assess dose  -continue 50mcg lt4 unless change indicated by labs  -recheck labs in 3 months if dose is changed today, otherwise recheck in 6 months about 1-2 weeks prior to followup  -discussed taking double dose the following day if he ever misses a dose  -will check TRAb to rule out combo graves'/lorna's which could change monitoring plan  -check vitamin D level     I have independently reviewed and interpreted labs, imaging as indicated.    RTC 6 months with repeat labs 1-2 weeks prior     Chief complaint:  Edmund is a 22 year old male seen in consultation for hypothyroid 2/2 hashimoto       HISTORY OF PRESENT ILLNESS    Edmund is a 22M with minimal PMH referred to endocrine for hypothyroidism.     Diagnosed about 4 years ago. Initial sx were heart palps, fatigue,  No appetite changes, denies diarrhea.   He was found to be hyperthyroid initially but then progressed to hypothyroidism.  TPO was positive.  Initial dosing was too high but once 50mcg maintenance dose was figure out he has remained on that dose stably for the last few years.  Sx have resolved.       Currently on 50mcg levothyroxine daily.  Taking daily on an empty stomach.  Not missing doses except for on rare occasion.  Not taking calcium or iron supplements.     Denies family hx of chronic illness. Not having unintentional weight loss or any GI  issues.    Endocrine relevant labs and/or imaging are as follows:  Component      Latest Ref Rng & Units 4/16/2019 9/18/2020 2/5/2021 4/18/2022   Thyroid Peroxidase Antibody      <35 IU/mL 207 (H)      T4 Free      0.76 - 1.46 ng/dL  1.17 1.23 1.11   TSH      0.40 - 4.00 mU/L  1.55 2.20 3.19     REVIEW OF SYSTEMS  Answers for HPI/ROS submitted by the patient on 3/28/2023  General Symptoms: No  Skin Symptoms: Yes  HENT Symptoms: No  EYE SYMPTOMS: No  HEART SYMPTOMS: Yes  LUNG SYMPTOMS: No  INTESTINAL SYMPTOMS: No  URINARY SYMPTOMS: No  REPRODUCTIVE SYMPTOMS: No  SKELETAL SYMPTOMS: No  BLOOD SYMPTOMS: No  NERVOUS SYSTEM SYMPTOMS: No  MENTAL HEALTH SYMPTOMS: No  Changes in hair: No  Changes in moles/birth marks: No  Itching: Yes  Rashes: No  Changes in nails: No  Acne: No  Change in facial hair: No  Warts: No  Non-healing sores: No  Scarring: No  Flaking of skin: No  Color changes of hands/feet in cold : No  Sun sensitivity: No  Skin thickening: No  Chest pain or pressure: No  Fast or irregular heartbeat: Yes  Pain in legs with walking: No  Trouble breathing while lying down: No  Fingers or toes appear blue: No  High blood pressure: No  Low blood pressure: No  Fainting: No  Murmurs: Yes  Pacemaker: No  Varicose veins: No  Edema or swelling: No  Wake up at night with shortness of breath: No  Light-headedness: No  Exercise intolerance: No      10 system ROS otherwise as per the HPI or negative    Past Medical History  Past Medical History:   Diagnosis Date     Moderate persistent asthma 11/19/2013     Unspecified asthma(493.90)        Medications  Current Outpatient Medications   Medication Sig Dispense Refill     levothyroxine (SYNTHROID/LEVOTHROID) 50 MCG tablet Take 1 tablet (50 mcg) by mouth daily 90 tablet 3       Allergies  No Known Allergies      Family History  family history is not on file.    Social History  Social History     Tobacco Use     Smoking status: Never     Smokeless tobacco: Never   Substance Use  Topics     Alcohol use: No     Drug use: No       Physical Exam  /68   Pulse 80   Temp 97.4  F (36.3  C) (Tympanic)   There is no height or weight on file to calculate BMI.    Physical Exam    GENERAL :  In no apparent distress  SKIN: Normal color, normal temperature     EYES: EOMI, No scleral icterus  NECK: No visible masses.    RESP: No respiratory distress, normal effort  CARDIO: regular rate  ABDOMEN: flat, nondistended       NEURO: awake, alert, responds appropriately to questions   EXTREMITIES: No clubbing, cyanosis or edema.    I spent a total of 31 minutes on the day of this new patient visit on chart review, lab review, coordinating care, exam and counseling of patient      Again, thank you for allowing me to participate in the care of your patient.        Sincerely,        Hugo Wallis MD

## 2023-03-30 LAB — TSH RECEP AB SER-ACNC: <1.1 IU/L (ref 0–1.75)

## 2023-04-15 ENCOUNTER — HEALTH MAINTENANCE LETTER (OUTPATIENT)
Age: 23
End: 2023-04-15

## 2023-05-26 ENCOUNTER — HOSPITAL ENCOUNTER (EMERGENCY)
Facility: CLINIC | Age: 23
Discharge: HOME | End: 2023-05-26
Attending: FAMILY MEDICINE | Admitting: FAMILY MEDICINE
Payer: COMMERCIAL

## 2023-05-26 ENCOUNTER — NURSE TRIAGE (OUTPATIENT)
Dept: FAMILY MEDICINE | Facility: CLINIC | Age: 23
End: 2023-05-26
Payer: COMMERCIAL

## 2023-05-26 VITALS
OXYGEN SATURATION: 98 % | HEART RATE: 104 BPM | DIASTOLIC BLOOD PRESSURE: 74 MMHG | SYSTOLIC BLOOD PRESSURE: 115 MMHG | BODY MASS INDEX: 26.66 KG/M2 | HEIGHT: 69 IN | WEIGHT: 180 LBS | RESPIRATION RATE: 18 BRPM | TEMPERATURE: 98.8 F

## 2023-05-26 DIAGNOSIS — K29.71 GASTRITIS WITH HEMORRHAGE, UNSPECIFIED CHRONICITY, UNSPECIFIED GASTRITIS TYPE: ICD-10-CM

## 2023-05-26 LAB
ABO/RH(D): NORMAL
ALBUMIN SERPL BCG-MCNC: 4.5 G/DL (ref 3.5–5.2)
ALP SERPL-CCNC: 51 U/L (ref 40–129)
ALT SERPL W P-5'-P-CCNC: 22 U/L (ref 10–50)
ANION GAP SERPL CALCULATED.3IONS-SCNC: 11 MMOL/L (ref 7–15)
ANTIBODY SCREEN: NEGATIVE
AST SERPL W P-5'-P-CCNC: 29 U/L (ref 10–50)
BASOPHILS # BLD AUTO: 0 10E3/UL (ref 0–0.2)
BASOPHILS NFR BLD AUTO: 0 %
BILIRUB SERPL-MCNC: 1 MG/DL
BUN SERPL-MCNC: 19.1 MG/DL (ref 6–20)
CALCIUM SERPL-MCNC: 9.1 MG/DL (ref 8.6–10)
CHLORIDE SERPL-SCNC: 101 MMOL/L (ref 98–107)
CREAT SERPL-MCNC: 1.2 MG/DL (ref 0.67–1.17)
DEPRECATED HCO3 PLAS-SCNC: 25 MMOL/L (ref 22–29)
EOSINOPHIL # BLD AUTO: 0 10E3/UL (ref 0–0.7)
EOSINOPHIL NFR BLD AUTO: 1 %
ERYTHROCYTE [DISTWIDTH] IN BLOOD BY AUTOMATED COUNT: 12.4 % (ref 10–15)
ETHANOL SERPL-MCNC: <0.01 G/DL
GFR SERPL CREATININE-BSD FRML MDRD: 88 ML/MIN/1.73M2
GLUCOSE SERPL-MCNC: 133 MG/DL (ref 70–99)
HCT VFR BLD AUTO: 47.8 % (ref 40–53)
HGB BLD-MCNC: 16.7 G/DL (ref 13.3–17.7)
HOLD SPECIMEN: NORMAL
HOLD SPECIMEN: NORMAL
IMM GRANULOCYTES # BLD: 0 10E3/UL
IMM GRANULOCYTES NFR BLD: 0 %
LIPASE SERPL-CCNC: 20 U/L (ref 13–60)
LYMPHOCYTES # BLD AUTO: 0.5 10E3/UL (ref 0.8–5.3)
LYMPHOCYTES NFR BLD AUTO: 6 %
MCH RBC QN AUTO: 30.6 PG (ref 26.5–33)
MCHC RBC AUTO-ENTMCNC: 34.9 G/DL (ref 31.5–36.5)
MCV RBC AUTO: 88 FL (ref 78–100)
MONOCYTES # BLD AUTO: 0.4 10E3/UL (ref 0–1.3)
MONOCYTES NFR BLD AUTO: 5 %
NEUTROPHILS # BLD AUTO: 6.8 10E3/UL (ref 1.6–8.3)
NEUTROPHILS NFR BLD AUTO: 88 %
NRBC # BLD AUTO: 0 10E3/UL
NRBC BLD AUTO-RTO: 0 /100
PLATELET # BLD AUTO: 141 10E3/UL (ref 150–450)
POTASSIUM SERPL-SCNC: 4.1 MMOL/L (ref 3.4–5.3)
PROT SERPL-MCNC: 6.9 G/DL (ref 6.4–8.3)
RBC # BLD AUTO: 5.45 10E6/UL (ref 4.4–5.9)
SODIUM SERPL-SCNC: 137 MMOL/L (ref 136–145)
SPECIMEN EXPIRATION DATE: NORMAL
WBC # BLD AUTO: 7.7 10E3/UL (ref 4–11)

## 2023-05-26 PROCEDURE — 36415 COLL VENOUS BLD VENIPUNCTURE: CPT | Performed by: FAMILY MEDICINE

## 2023-05-26 PROCEDURE — 86850 RBC ANTIBODY SCREEN: CPT | Performed by: FAMILY MEDICINE

## 2023-05-26 PROCEDURE — 258N000003 HC RX IP 258 OP 636: Performed by: FAMILY MEDICINE

## 2023-05-26 PROCEDURE — 96361 HYDRATE IV INFUSION ADD-ON: CPT | Performed by: FAMILY MEDICINE

## 2023-05-26 PROCEDURE — 99285 EMERGENCY DEPT VISIT HI MDM: CPT | Performed by: FAMILY MEDICINE

## 2023-05-26 PROCEDURE — 80053 COMPREHEN METABOLIC PANEL: CPT | Performed by: FAMILY MEDICINE

## 2023-05-26 PROCEDURE — C9113 INJ PANTOPRAZOLE SODIUM, VIA: HCPCS | Performed by: FAMILY MEDICINE

## 2023-05-26 PROCEDURE — 250N000011 HC RX IP 250 OP 636: Performed by: FAMILY MEDICINE

## 2023-05-26 PROCEDURE — 96374 THER/PROPH/DIAG INJ IV PUSH: CPT | Performed by: FAMILY MEDICINE

## 2023-05-26 PROCEDURE — 96375 TX/PRO/DX INJ NEW DRUG ADDON: CPT | Performed by: FAMILY MEDICINE

## 2023-05-26 PROCEDURE — 85025 COMPLETE CBC W/AUTO DIFF WBC: CPT | Performed by: FAMILY MEDICINE

## 2023-05-26 PROCEDURE — 99284 EMERGENCY DEPT VISIT MOD MDM: CPT | Mod: 25 | Performed by: FAMILY MEDICINE

## 2023-05-26 PROCEDURE — 83690 ASSAY OF LIPASE: CPT | Performed by: FAMILY MEDICINE

## 2023-05-26 PROCEDURE — 82077 ASSAY SPEC XCP UR&BREATH IA: CPT | Performed by: FAMILY MEDICINE

## 2023-05-26 RX ORDER — ONDANSETRON 4 MG/1
4 TABLET, ORALLY DISINTEGRATING ORAL EVERY 8 HOURS PRN
Qty: 10 TABLET | Refills: 0 | Status: SHIPPED | OUTPATIENT
Start: 2023-05-26 | End: 2023-05-29

## 2023-05-26 RX ORDER — HYDROMORPHONE HYDROCHLORIDE 1 MG/ML
0.5 INJECTION, SOLUTION INTRAMUSCULAR; INTRAVENOUS; SUBCUTANEOUS
Status: DISCONTINUED | OUTPATIENT
Start: 2023-05-26 | End: 2023-05-26 | Stop reason: HOSPADM

## 2023-05-26 RX ORDER — CLINDAMYCIN HCL 300 MG
300 CAPSULE ORAL 4 TIMES DAILY
Qty: 40 CAPSULE | Refills: 0 | Status: SHIPPED | OUTPATIENT
Start: 2023-05-26 | End: 2023-06-05

## 2023-05-26 RX ORDER — ONDANSETRON 2 MG/ML
4 INJECTION INTRAMUSCULAR; INTRAVENOUS ONCE
Status: COMPLETED | OUTPATIENT
Start: 2023-05-26 | End: 2023-05-26

## 2023-05-26 RX ORDER — PANTOPRAZOLE SODIUM 40 MG/1
40 TABLET, DELAYED RELEASE ORAL DAILY
Qty: 30 TABLET | Refills: 0 | Status: SHIPPED | OUTPATIENT
Start: 2023-05-26 | End: 2023-06-25

## 2023-05-26 RX ADMIN — HYDROMORPHONE HYDROCHLORIDE 0.5 MG: 1 INJECTION, SOLUTION INTRAMUSCULAR; INTRAVENOUS; SUBCUTANEOUS at 10:23

## 2023-05-26 RX ADMIN — SODIUM CHLORIDE 1000 ML: 9 INJECTION, SOLUTION INTRAVENOUS at 08:53

## 2023-05-26 RX ADMIN — PANTOPRAZOLE SODIUM 40 MG: 40 INJECTION, POWDER, LYOPHILIZED, FOR SOLUTION INTRAVENOUS at 08:59

## 2023-05-26 RX ADMIN — ONDANSETRON 4 MG: 2 INJECTION INTRAMUSCULAR; INTRAVENOUS at 08:57

## 2023-05-26 ASSESSMENT — ACTIVITIES OF DAILY LIVING (ADL)
ADLS_ACUITY_SCORE: 35
ADLS_ACUITY_SCORE: 35

## 2023-05-26 NOTE — TELEPHONE ENCOUNTER
"Nurse Triage SBAR    Is this a 2nd Level Triage? NO    Situation: patient reports vomiting a couple tablespoons of blood, 4 times during the night. Last time he vomited was 4 am. Had 1 episode of diarrhea this morning that did not have blood.     Background: On amoxicillin for infected wisdom tooth. Not on blood thinner.     Assessment: Vomited blood, small amount 4 times.     Protocol Recommended Disposition:   Go to ED Now    Recommendation: not routed     not routed    Does the patient meet one of the following criteria for ADS visit consideration? 16+ years old, with an [facility] PCP     TIP  Providers, please consider if this condition is appropriate for management at one of our Acute and Diagnostic Services sites.     If patient is a good candidate, please use dotphrase <dot>triageresponse and select Refer to ADS to document.    Reason for Disposition    Vomited blood and more than a few streaks of blood  (Exceptions: Few streaks that occurred only once, or swallowed blood from a nosebleed or cut in the mouth.)    Additional Information    Negative: Shock suspected (e.g., cold/pale/clammy skin, too weak to stand, low BP, rapid pulse)    Negative: Difficult to awaken or acting confused (e.g., disoriented, slurred speech)    Negative: Unable to walk, or can only walk with assistance (e.g., requires support)    Negative: Fainted    Negative: Vomited blood and large amount (example: \"a cup of blood\")    Negative: Rectal bleeding (i.e., bloody stool, blood in stool)    Negative: Sounds like a life-threatening emergency to the triager    Negative: Coughing up blood (i.e., from lungs)    Negative: Weak, dizzy or lightheaded    Answer Assessment - Initial Assessment Questions  1. APPEARANCE of BLOOD: \"What does the blood look like?\" (e.g., color, coffee-grounds)      Bright blood, 4 separate times.  2. AMOUNT: \"How much blood was lost?\"      Not much. Ate first regular vomit, watery and then dry heaves and then small " "amount of blood. Maybe a TBLSP or two of blood.   3. VOMITING BLOOD: \"How many times did it happen?\" or \"How many times in the past 24 hours?\"      4 times starting at 2 am.  4. VOMITING WITHOUT BLOOD: \"How many times in the past 24 hours?\"       4 times small amount. Last time 4 am. Had diarrhea this morning. Just a little.   5. ONSET: \"When did vomiting of blood begin?\"      2 am  6. CAUSE: \"What do you think is causing the vomiting of blood?\"      Maybe a stomach ulcer? Patient went in this week for an infected wisdom tooth and was prescribed amoxicillin. Started amox Monday.   7. BLOOD THINNERS: \"Do you take any blood thinners?\" (e.g., Coumadin/warfarin, Pradaxa/dabigatran, aspirin)      no  8. DEHYDRATION: \"Are there any signs of dehydration?\" \"When was the last time you urinated?\" \"Do you feel dizzy?\"      Urinated last at bedtime.   9. ABDOMINAL PAIN: \"Are you having any abdominal pain?\" If Yes, ask: \"What does it feel like? \" (e.g., crampy, dull, intermittent, constant)       Stomach feels unsettled.   10. DIARRHEA: \"Is there any diarrhea?\" If Yes, ask: \"How many times today?\"         Small amount of diarrhea this morning  11. OTHER SYMPTOMS: \"Do you have any other symptoms?\" (e.g., fever, blood in stool)        Does not feel like fever. Hasn't checked.   12. PREGNANCY: \"Is there any chance you are pregnant?\" \"When was your last menstrual period?\"        NA    Protocols used: VOMITING BLOOD-A-OH    Mayuri Shankar RN    "

## 2023-05-26 NOTE — DISCHARGE INSTRUCTIONS
Discontinue amoxicillin.  Clindamycin 300 mg p.o. 4 times daily x10 days as a replacement for your dental infection.  Zofran if needed for nausea/vomiting.  Protonix 40 mg p.o. daily x2 weeks.  No alcohol for the next 2 weeks.  Complete avoidance of NSAID class medications as we discussed.  If you have persistent symptoms beyond the next 2 weeks please follow-up in clinic with your primary care provider.  If worse to return to the emergency department

## 2023-05-26 NOTE — ED TRIAGE NOTES
Pt reports mid upper abdominal pain and blood in vomit since 0100 today. Pt does not take Ibuprofen and drinks on the weekends. Recently prescribed amoxicillin for an infected wisdom tooth.      Triage Assessment     Row Name 05/26/23 0813       Triage Assessment (Adult)    Airway WDL WDL       Respiratory WDL    Respiratory WDL WDL       Cardiac WDL    Cardiac WDL X;rhythm    Pulse Rate & Regularity tachycardic       Cognitive/Neuro/Behavioral WDL    Cognitive/Neuro/Behavioral WDL WDL

## 2023-05-26 NOTE — ED PROVIDER NOTES
History     Chief Complaint   Patient presents with     Abdominal Pain     Mid upper     Hematemesis     X5 since last night     HPI  Edmund Beasley is a 22 year old male, past medical history is significant for hypothyroidism, asthma, allergic rhinitis, presents to the emergency department with concerns of upper abdominal pain and reportedly bloody emesis x5 since yesterday.  History is obtained from the patient who presents with his significant other.  He states that he was in his usual state of good health last night but awoke at 130 this morning with epigastric area abdominal pain nausea and 5 episodes so far of bright red blood 1 to 2 teaspoons initially and then he states more recently upon arrival to the emergency department about half a cup of bright red blood.  He is still nauseated and drinking water went into the room.  He recently started 3 days ago on amoxicillin for a dental infection, he takes no NSAIDs, no previous reaction to amoxicillin that he can recall and he thinks he had it before.  The patient drinks alcohol but only on the weekend by his account and this can be 10 or 12 beer at a time, usually 1 day of the weekend.  He has not had any alcohol since this past weekend at least 5 days ago.  He had no symptoms in the 130 this morning.  He denies any fever chills or sweats.      Allergies:  No Known Allergies    Problem List:    Patient Active Problem List    Diagnosis Date Noted     Hypothyroidism due to Hashimoto's thyroiditis 03/29/2023     Priority: Medium     Intermittent asthma 08/07/2014     Priority: Medium     Was more persistent in fall/winter 2013 - used Symbicort and needed oral steroids for a couple of months        AR (allergic rhinitis) 11/19/2013     Priority: Medium        Past Medical History:    Past Medical History:   Diagnosis Date     Moderate persistent asthma 11/19/2013     Unspecified asthma(493.90)        Past Surgical History:    No past surgical history on  "file.    Family History:    No family history on file.    Social History:  Marital Status:  Single [1]  Social History     Tobacco Use     Smoking status: Never     Smokeless tobacco: Never   Substance Use Topics     Alcohol use: No     Drug use: No        Medications:    clindamycin (CLEOCIN) 300 MG capsule  ondansetron (ZOFRAN ODT) 4 MG ODT tab  pantoprazole (PROTONIX) 40 MG EC tablet  levothyroxine (SYNTHROID/LEVOTHROID) 50 MCG tablet          Review of Systems   All other systems reviewed and are negative.      Physical Exam   BP: 115/74  Pulse: 104  Temp: 98.8  F (37.1  C)  Resp: 18  Height: 175.3 cm (5' 9\")  Weight: 81.6 kg (180 lb)  SpO2: 95 %      Physical Exam  Vitals and nursing note reviewed.   Constitutional:       General: He is not in acute distress.     Appearance: He is well-developed and normal weight. He is not ill-appearing.   HENT:      Head: Normocephalic and atraumatic.      Mouth/Throat:      Mouth: Mucous membranes are moist.      Pharynx: Oropharynx is clear.   Eyes:      Extraocular Movements: Extraocular movements intact.      Pupils: Pupils are equal, round, and reactive to light.   Cardiovascular:      Rate and Rhythm: Normal rate and regular rhythm.      Heart sounds: Normal heart sounds.   Pulmonary:      Effort: Pulmonary effort is normal.      Breath sounds: Normal breath sounds.   Abdominal:      Comments: Active bowel sounds, tender epigastrium with voluntary guarding no rebound no referred pain.  No CVA tenderness.   Skin:     General: Skin is warm and dry.      Capillary Refill: Capillary refill takes less than 2 seconds.   Neurological:      General: No focal deficit present.      Mental Status: He is alert.   Psychiatric:         Mood and Affect: Mood normal.         Behavior: Behavior normal.         ED Course                 Procedures                Results for orders placed or performed during the hospital encounter of 05/26/23 (from the past 24 hour(s))   CBC with " platelets, differential    Narrative    The following orders were created for panel order CBC with platelets, differential.  Procedure                               Abnormality         Status                     ---------                               -----------         ------                     CBC with platelets and d...[[id number]]  Abnormal            Final result                 Please view results for these tests on the individual orders.   Comprehensive metabolic panel   Result Value Ref Range    Sodium 137 136 - 145 mmol/L    Potassium 4.1 3.4 - 5.3 mmol/L    Chloride 101 98 - 107 mmol/L    Carbon Dioxide (CO2) 25 22 - 29 mmol/L    Anion Gap 11 7 - 15 mmol/L    Urea Nitrogen 19.1 6.0 - 20.0 mg/dL    Creatinine 1.20 (H) 0.67 - 1.17 mg/dL    Calcium 9.1 8.6 - 10.0 mg/dL    Glucose 133 (H) 70 - 99 mg/dL    Alkaline Phosphatase 51 40 - 129 U/L    AST 29 10 - 50 U/L    ALT 22 10 - 50 U/L    Protein Total 6.9 6.4 - 8.3 g/dL    Albumin 4.5 3.5 - 5.2 g/dL    Bilirubin Total 1.0 <=1.2 mg/dL    GFR Estimate 88 >60 mL/min/1.73m2   Lipase   Result Value Ref Range    Lipase 20 13 - 60 U/L   ABO/Rh type and screen    Narrative    The following orders were created for panel order ABO/Rh type and screen.  Procedure                               Abnormality         Status                     ---------                               -----------         ------                     Adult Type and Screen[[id number]]                            Edited Result - FINAL        Please view results for these tests on the individual orders.   Alcohol level blood   Result Value Ref Range    Alcohol ethyl <0.01 <=0.01 g/dL   CBC with platelets and differential   Result Value Ref Range    WBC Count 7.7 4.0 - 11.0 10e3/uL    RBC Count 5.45 4.40 - 5.90 10e6/uL    Hemoglobin 16.7 13.3 - 17.7 g/dL    Hematocrit 47.8 40.0 - 53.0 %    MCV 88 78 - 100 fL    MCH 30.6 26.5 - 33.0 pg    MCHC 34.9 31.5 - 36.5 g/dL    RDW 12.4 10.0 - 15.0 %    Platelet  Count 141 (L) 150 - 450 10e3/uL    % Neutrophils 88 %    % Lymphocytes 6 %    % Monocytes 5 %    % Eosinophils 1 %    % Basophils 0 %    % Immature Granulocytes 0 %    NRBCs per 100 WBC 0 <1 /100    Absolute Neutrophils 6.8 1.6 - 8.3 10e3/uL    Absolute Lymphocytes 0.5 (L) 0.8 - 5.3 10e3/uL    Absolute Monocytes 0.4 0.0 - 1.3 10e3/uL    Absolute Eosinophils 0.0 0.0 - 0.7 10e3/uL    Absolute Basophils 0.0 0.0 - 0.2 10e3/uL    Absolute Immature Granulocytes 0.0 <=0.4 10e3/uL    Absolute NRBCs 0.0 10e3/uL   Adult Type and Screen   Result Value Ref Range    ABO/RH(D) A NEG     Antibody Screen Negative Negative    SPECIMEN EXPIRATION DATE [id number]    Extra Tube ([place] Draw)    Narrative    The following orders were created for panel order Extra Tube ([place] Draw).  Procedure                               Abnormality         Status                     ---------                               -----------         ------                     Extra Blue Top Tube[[id number]]                              Final result               Extra Red Top Tube[[id number]]                               Final result                 Please view results for these tests on the individual orders.   Extra Blue Top Tube   Result Value Ref Range    Hold Specimen JIC    Extra Red Top Tube   Result Value Ref Range    Hold Specimen JIC        Medications   HYDROmorphone (PF) (DILAUDID) injection 0.5 mg (0.5 mg Intravenous $Given 5/26/23 1023)   0.9% sodium chloride BOLUS (0 mLs Intravenous Stopped 5/26/23 1013)   ondansetron (ZOFRAN) injection 4 mg (4 mg Intravenous $Given 5/26/23 0857)   pantoprazole (PROTONIX) IV push injection 40 mg (40 mg Intravenous $Given 5/26/23 0859)     12:01 PM  Pain and nausea have both improved significantly.  The patient is able to tolerate oral fluids.  Reviewed all lab diagnostics in the room with the patient and his significant other and mother.  We clarified what an NSAID (ibuprofen, naproxen, aspirin) is and  the need to completely avoid that as well as alcohol.  Unlikely that amoxicillin is playing a role here but I will have him discontinue the amoxicillin and start clindamycin for his dental concern infection.  Complete avoidance of alcohol, he may use acetaminophen for pain if he needs to.  Regularly dosed Protonix once daily next couple of weeks.  If he has persistent symptoms despite all of the above then he may follow-up in clinic with his primary care provider.  If he has any worsening or other concerning symptoms develop he will return to the emergency department.  We discussed that the most likely etiology for his hematemesis relates to gastritis/peptic ulcer disease.  I am more suspicious about alcohol and I was sandy with him about this.  He binges every weekend and this needs to stop.  I stressed that the amoxicillin was unlikely to be playing a role here but out of an abundance of caution I have asked him to discontinue it and use an alternate antibiotic as prescribed.  His vital signs have been stable and within adult normal limits during the course of his stay here his hemoglobin is excellent at 16.7 and his white count is normal at 7.7.  Benign abdominal exam.  Appropriate disposition to home.    Assessments & Plan (with Medical Decision Making)   Assessments and plan with medical decision making at the time stamp above.    Disclaimer: This note consists of symbols derived from keyboarding, dictation and/or voice recognition software. As a result, there may be errors in the script that have gone undetected. Please consider this when interpreting information found in this chart.    I have reviewed the nursing notes.    I have reviewed the findings, diagnosis, plan and need for follow up with the patient.        New Prescriptions    CLINDAMYCIN (CLEOCIN) 300 MG CAPSULE    Take 1 capsule (300 mg) by mouth 4 times daily for 10 days    ONDANSETRON (ZOFRAN ODT) 4 MG ODT TAB    Take 1 tablet (4 mg) by mouth  every 8 hours as needed for nausea    PANTOPRAZOLE (PROTONIX) 40 MG EC TABLET    Take 1 tablet (40 mg) by mouth daily for 30 doses       Final diagnoses:   Gastritis with hemorrhage, unspecified chronicity, unspecified gastritis type       5/26/2023   [facility] EMERGENCY DEPT     Golden Sanchez MD  05/26/23 5197

## 2023-06-23 RX ORDER — PANTOPRAZOLE SODIUM 40 MG/1
40 TABLET, DELAYED RELEASE ORAL DAILY
Qty: 30 TABLET | Refills: 0 | Status: CANCELLED | OUTPATIENT
Start: 2023-06-23

## 2023-06-23 NOTE — TELEPHONE ENCOUNTER
Pending Prescriptions:                       Disp   Refills    pantoprazole (PROTONIX) 40 MG EC tablet   30 tab*0            Sig: Take 1 tablet (40 mg) by mouth daily

## 2023-09-14 ASSESSMENT — ENCOUNTER SYMPTOMS
PALPITATIONS: 1
HYPERTENSION: 0
ORTHOPNEA: 0
LIGHT-HEADEDNESS: 0
HYPOTENSION: 0
LEG PAIN: 0
SLEEP DISTURBANCES DUE TO BREATHING: 0
EXERCISE INTOLERANCE: 0
SYNCOPE: 0

## 2023-09-21 ENCOUNTER — OFFICE VISIT (OUTPATIENT)
Dept: ENDOCRINOLOGY | Facility: CLINIC | Age: 23
End: 2023-09-21
Payer: COMMERCIAL

## 2023-09-21 VITALS
OXYGEN SATURATION: 97 % | BODY MASS INDEX: 26.81 KG/M2 | HEART RATE: 70 BPM | DIASTOLIC BLOOD PRESSURE: 49 MMHG | WEIGHT: 181 LBS | RESPIRATION RATE: 20 BRPM | TEMPERATURE: 97.7 F | HEIGHT: 69 IN | SYSTOLIC BLOOD PRESSURE: 111 MMHG

## 2023-09-21 DIAGNOSIS — E06.3 HYPOTHYROIDISM DUE TO HASHIMOTO'S THYROIDITIS: Primary | ICD-10-CM

## 2023-09-21 DIAGNOSIS — E55.9 VITAMIN D DEFICIENCY: ICD-10-CM

## 2023-09-21 DIAGNOSIS — Z00.00 HEALTHCARE MAINTENANCE: ICD-10-CM

## 2023-09-21 LAB
HBA1C MFR BLD: 5.1 % (ref 0–5.6)
TSH SERPL DL<=0.005 MIU/L-ACNC: 0.81 UIU/ML (ref 0.3–4.2)

## 2023-09-21 PROCEDURE — 84443 ASSAY THYROID STIM HORMONE: CPT | Performed by: INTERNAL MEDICINE

## 2023-09-21 PROCEDURE — 82306 VITAMIN D 25 HYDROXY: CPT | Performed by: INTERNAL MEDICINE

## 2023-09-21 PROCEDURE — 99214 OFFICE O/P EST MOD 30 MIN: CPT | Performed by: INTERNAL MEDICINE

## 2023-09-21 PROCEDURE — 83036 HEMOGLOBIN GLYCOSYLATED A1C: CPT | Performed by: INTERNAL MEDICINE

## 2023-09-21 PROCEDURE — 36415 COLL VENOUS BLD VENIPUNCTURE: CPT | Performed by: INTERNAL MEDICINE

## 2023-09-21 ASSESSMENT — PAIN SCALES - GENERAL: PAINLEVEL: NO PAIN (0)

## 2023-09-21 NOTE — LETTER
9/21/2023         RE: Edmund Beasley  [id number] [address] [facility] [address]        Dear Colleague,    Thank you for referring your patient, Edmund Beasley, to the [facility] ENDOCRINOLOGY. Please see a copy of my visit note below.    Endocrine Consult note    Attending Assessment/Plan :        Hypothyroidism due to Hashimoto's thyroiditis  Healthcare maintenance  Vitamin D deficiency  Assessment:  -hashimoto initially presented as hyperthyroidism - likely began with thyroiditis and is now permanently hypothyroid given presence of TPO   -not at full weight based replacement dose which may imply he has some residual, but insufficient, gland function.  This could decline over time with dose gradually going up - discussed how this can take years     Plan:  -recheck TFTs to assess dose  -continue 50mcg lt4 unless change indicated by labs  -with dose remaining stable, will turn management to primary care.  Would recommend the following for monitoring and management:   -TSH every 6-12 months (additional checks if needed due to new symptoms)   -increase dose to next levothyroxine dose if TSH trends above upper limit   -doses will likely escalate over time as more of his gland gets burned out but this is unlikely to happen suddenly and would be more likely to happen gradually over years   -recheck TSH 2-3 months after any levothyroxine dose change  -placed referral to establish with primary care as pt doesn't have PCP currently      I have independently reviewed and interpreted labs, imaging as indicated.    RTC prn    Chief complaint:  Edmund is a 22 year old male seen in consultation for hypothyroid 2/2 hashimotos       HISTORY OF PRESENT ILLNESS    Edmund is a 22M with minimal PMH referred to endocrine for hypothyroidism. He comes today for followup.     Initial HPI:  Diagnosed about 4 years ago. Initial sx were heart palps, fatigue,  No appetite changes, denies diarrhea.   He was found to be hyperthyroid  initially but then progressed to hypothyroidism.  TPO was positive.  Initial dosing was too high but once 50mcg maintenance dose was figure out he has remained on that dose stably for the last few years.  Sx have resolved.       Currently on 50mcg levothyroxine daily.  Taking daily on an empty stomach.  Not missing doses except for on rare occasion.  Not taking calcium or iron supplements.     Denies family hx of chronic illness. Not having unintentional weight loss or any GI issues.    Today: Taking 50mcg levothyroxine daily.  Takes on empty stomach.  Not missing any doses.    Reports energy level is good.    Denies any GI issues.  No constipation.      After last visit TRAb was negative.  Hasn't started vitamin D supplement at this time.       Endocrine relevant labs and/or imaging are as follows:  Component      Latest Ref Rng & Units 4/16/2019 9/18/2020 2/5/2021 4/18/2022   Thyroid Peroxidase Antibody      <35 IU/mL 207 (H)      T4 Free      0.76 - 1.46 ng/dL  1.17 1.23 1.11   TSH      0.40 - 4.00 mU/L  1.55 2.20 3.19     Component      Latest Ref Rng 3/29/2023  10:35 AM   TSH      0.30 - 4.20 uIU/mL 1.47    T4 Free      0.90 - 1.70 ng/dL 1.49    Triiodothyronine (T3)      85 - 202 ng/dL 105    Vitamin D Deficiency screening      20 - 75 ug/L 22    Thyrotropin Receptor Antibody      0.00 - 1.75 IU/L <1.10        REVIEW OF SYSTEMS    10 system ROS otherwise as per the HPI or negative    Past Medical History  Past Medical History:   Diagnosis Date     Moderate persistent asthma 11/19/2013     Unspecified asthma(493.90)        Medications  Current Outpatient Medications   Medication Sig Dispense Refill     levothyroxine (SYNTHROID/LEVOTHROID) 50 MCG tablet Take 1 tablet (50 mcg) by mouth daily 90 tablet 4       Allergies  No Known Allergies      Family History  family history is not on file.    Social History  Social History     Tobacco Use     Smoking status: Never     Smokeless tobacco: Never   Substance Use  "Topics     Alcohol use: Yes     Comment: occasional     Drug use: No       Physical Exam  /49 (BP Location: Left arm, Patient Position: Sitting, Cuff Size: Adult Regular)   Pulse 70   Temp 97.7  F (36.5  C) (Tympanic)   Resp 20   Ht 1.753 m (5' 9\")   Wt 82.1 kg (181 lb)   SpO2 97%   BMI 26.73 kg/m    Body mass index is 26.73 kg/m .    Physical Exam    GENERAL :  In no apparent distress  SKIN: Normal color, normal temperature     EYES: EOMI, No scleral icterus  NECK: No visible masses.    RESP: No respiratory distress, normal effort  CARDIO: regular rate  ABDOMEN: flat, nondistended       NEURO: awake, alert, responds appropriately to questions   EXTREMITIES: No clubbing, cyanosis or edema.    I spent a total of 35 minutes on the day of this established patient visit on chart review, lab review, coordinating care, exam and counseling of patient    Answers submitted by the patient for this visit:  Symptoms you have experienced in the last 30 days (Submitted on 9/14/2023)  General Symptoms: No  Skin Symptoms: No  HENT Symptoms: No  EYE SYMPTOMS: No  HEART SYMPTOMS: Yes  LUNG SYMPTOMS: No  INTESTINAL SYMPTOMS: No  URINARY SYMPTOMS: No  REPRODUCTIVE SYMPTOMS: No  SKELETAL SYMPTOMS: No  BLOOD SYMPTOMS: No  NERVOUS SYSTEM SYMPTOMS: No  MENTAL HEALTH SYMPTOMS: No  Please answer the questions below to tell us what condition you are experiencing: (Submitted on 9/14/2023)  Chest pain or pressure: No  Fast or irregular heartbeat: Yes  Pain in legs with walking: No  Trouble breathing while lying down: No  Fingers or toes appear blue: No  High blood pressure: No  Low blood pressure: No  Fainting: No  Murmurs: No  Pacemaker: No  Varicose veins: No  Edema or swelling: No  Wake up at night with shortness of breath: No  Light-headedness: No  Exercise intolerance: No      Again, thank you for allowing me to participate in the care of your patient.        Sincerely,        Hugo Wallis MD  "

## 2023-09-21 NOTE — PROGRESS NOTES
Endocrine Consult note    Attending Assessment/Plan :        Hypothyroidism due to Hashimoto's thyroiditis  Healthcare maintenance  Vitamin D deficiency  Assessment:  -hashimoto initially presented as hyperthyroidism - likely began with thyroiditis and is now permanently hypothyroid given presence of TPO   -not at full weight based replacement dose which may imply he has some residual, but insufficient, gland function.  This could decline over time with dose gradually going up - discussed how this can take years     Plan:  -recheck TFTs to assess dose  -continue 50mcg lt4 unless change indicated by labs  -with dose remaining stable, will turn management to primary care.  Would recommend the following for monitoring and management:   -TSH every 6-12 months (additional checks if needed due to new symptoms)   -increase dose to next levothyroxine dose if TSH trends above upper limit   -doses will likely escalate over time as more of his gland gets burned out but this is unlikely to happen suddenly and would be more likely to happen gradually over years   -recheck TSH 2-3 months after any levothyroxine dose change  -placed referral to establish with primary care as pt doesn't have PCP currently      I have independently reviewed and interpreted labs, imaging as indicated.    RTC prn    Chief complaint:  Edmund is a 22 year old male seen in consultation for hypothyroid 2/2 hashimotos       HISTORY OF PRESENT ILLNESS    Edmund is a 22M with minimal PMH referred to endocrine for hypothyroidism. He comes today for followup.     Initial HPI:  Diagnosed about 4 years ago. Initial sx were heart palps, fatigue,  No appetite changes, denies diarrhea.   He was found to be hyperthyroid initially but then progressed to hypothyroidism.  TPO was positive.  Initial dosing was too high but once 50mcg maintenance dose was figure out he has remained on that dose stably for the last few years.  Sx have resolved.       Currently on 50mcg  "levothyroxine daily.  Taking daily on an empty stomach.  Not missing doses except for on rare occasion.  Not taking calcium or iron supplements.     Denies family hx of chronic illness. Not having unintentional weight loss or any GI issues.    Today: Taking 50mcg levothyroxine daily.  Takes on empty stomach.  Not missing any doses.    Reports energy level is good.    Denies any GI issues.  No constipation.      After last visit TRAb was negative.  Hasn't started vitamin D supplement at this time.       Endocrine relevant labs and/or imaging are as follows:  Component      Latest Ref Rng & Units 4/16/2019 9/18/2020 2/5/2021 4/18/2022   Thyroid Peroxidase Antibody      <35 IU/mL 207 (H)      T4 Free      0.76 - 1.46 ng/dL  1.17 1.23 1.11   TSH      0.40 - 4.00 mU/L  1.55 2.20 3.19     Component      Latest Ref Rng 3/29/2023  10:35 AM   TSH      0.30 - 4.20 uIU/mL 1.47    T4 Free      0.90 - 1.70 ng/dL 1.49    Triiodothyronine (T3)      85 - 202 ng/dL 105    Vitamin D Deficiency screening      20 - 75 ug/L 22    Thyrotropin Receptor Antibody      0.00 - 1.75 IU/L <1.10        REVIEW OF SYSTEMS    10 system ROS otherwise as per the HPI or negative    Past Medical History  Past Medical History:   Diagnosis Date    Moderate persistent asthma 11/19/2013    Unspecified asthma(493.90)        Medications  Current Outpatient Medications   Medication Sig Dispense Refill    levothyroxine (SYNTHROID/LEVOTHROID) 50 MCG tablet Take 1 tablet (50 mcg) by mouth daily 90 tablet 4       Allergies  No Known Allergies      Family History  family history is not on file.    Social History  Social History     Tobacco Use    Smoking status: Never    Smokeless tobacco: Never   Substance Use Topics    Alcohol use: Yes     Comment: occasional    Drug use: No       Physical Exam  /49 (BP Location: Left arm, Patient Position: Sitting, Cuff Size: Adult Regular)   Pulse 70   Temp 97.7  F (36.5  C) (Tympanic)   Resp 20   Ht 1.753 m (5' 9\")  "  Wt 82.1 kg (181 lb)   SpO2 97%   BMI 26.73 kg/m    Body mass index is 26.73 kg/m .    Physical Exam    GENERAL :  In no apparent distress  SKIN: Normal color, normal temperature     EYES: EOMI, No scleral icterus  NECK: No visible masses.    RESP: No respiratory distress, normal effort  CARDIO: regular rate  ABDOMEN: flat, nondistended       NEURO: awake, alert, responds appropriately to questions   EXTREMITIES: No clubbing, cyanosis or edema.    I spent a total of 35 minutes on the day of this established patient visit on chart review, lab review, coordinating care, exam and counseling of patient    Answers submitted by the patient for this visit:  Symptoms you have experienced in the last 30 days (Submitted on 9/14/2023)  General Symptoms: No  Skin Symptoms: No  HENT Symptoms: No  EYE SYMPTOMS: No  HEART SYMPTOMS: Yes  LUNG SYMPTOMS: No  INTESTINAL SYMPTOMS: No  URINARY SYMPTOMS: No  REPRODUCTIVE SYMPTOMS: No  SKELETAL SYMPTOMS: No  BLOOD SYMPTOMS: No  NERVOUS SYSTEM SYMPTOMS: No  MENTAL HEALTH SYMPTOMS: No  Please answer the questions below to tell us what condition you are experiencing: (Submitted on 9/14/2023)  Chest pain or pressure: No  Fast or irregular heartbeat: Yes  Pain in legs with walking: No  Trouble breathing while lying down: No  Fingers or toes appear blue: No  High blood pressure: No  Low blood pressure: No  Fainting: No  Murmurs: No  Pacemaker: No  Varicose veins: No  Edema or swelling: No  Wake up at night with shortness of breath: No  Light-headedness: No  Exercise intolerance: No

## 2023-09-21 NOTE — NURSING NOTE
"Chief Complaint   Patient presents with    Thyroid Problem     3 month thyroid       Vitals:    09/21/23 1108   BP: 111/49   BP Location: Left arm   Patient Position: Sitting   Cuff Size: Adult Regular   Pulse: 70   Resp: 20   Temp: 97.7  F (36.5  C)   TempSrc: Tympanic   SpO2: 97%   Weight: 82.1 kg (181 lb)   Height: 1.753 m (5' 9\")     Wt Readings from Last 1 Encounters:   09/21/23 82.1 kg (181 lb)       Irene Palacios................9/21/2023    "

## 2023-09-22 LAB — DEPRECATED CALCIDIOL+CALCIFEROL SERPL-MC: 33 UG/L (ref 20–75)

## 2023-09-25 RX ORDER — LEVOTHYROXINE SODIUM 50 UG/1
50 TABLET ORAL DAILY
Qty: 90 TABLET | Refills: 4 | Status: SHIPPED | OUTPATIENT
Start: 2023-09-25

## 2023-10-12 ENCOUNTER — OFFICE VISIT (OUTPATIENT)
Dept: FAMILY MEDICINE | Facility: CLINIC | Age: 23
End: 2023-10-12
Attending: INTERNAL MEDICINE
Payer: COMMERCIAL

## 2023-10-12 VITALS
HEIGHT: 69 IN | HEART RATE: 89 BPM | TEMPERATURE: 98.2 F | OXYGEN SATURATION: 98 % | SYSTOLIC BLOOD PRESSURE: 120 MMHG | BODY MASS INDEX: 27.34 KG/M2 | RESPIRATION RATE: 20 BRPM | DIASTOLIC BLOOD PRESSURE: 64 MMHG | WEIGHT: 184.6 LBS

## 2023-10-12 DIAGNOSIS — Z00.00 HEALTHCARE MAINTENANCE: ICD-10-CM

## 2023-10-12 DIAGNOSIS — Z11.4 SCREENING FOR HIV (HUMAN IMMUNODEFICIENCY VIRUS): ICD-10-CM

## 2023-10-12 DIAGNOSIS — E06.3 HYPOTHYROIDISM DUE TO HASHIMOTO'S THYROIDITIS: ICD-10-CM

## 2023-10-12 DIAGNOSIS — Z13.9 ENCOUNTER FOR SCREENING INVOLVING SOCIAL DETERMINANTS OF HEALTH (SDOH): Primary | ICD-10-CM

## 2023-10-12 DIAGNOSIS — Z11.59 NEED FOR HEPATITIS C SCREENING TEST: ICD-10-CM

## 2023-10-12 PROCEDURE — 99213 OFFICE O/P EST LOW 20 MIN: CPT | Performed by: STUDENT IN AN ORGANIZED HEALTH CARE EDUCATION/TRAINING PROGRAM

## 2023-10-12 ASSESSMENT — ENCOUNTER SYMPTOMS
SINUS PAIN: 0
DYSURIA: 0
FEVER: 0
NECK PAIN: 0
ABDOMINAL PAIN: 0
COUGH: 0
EYE PAIN: 0
HEADACHES: 0

## 2023-10-12 ASSESSMENT — PAIN SCALES - GENERAL: PAINLEVEL: NO PAIN (0)

## 2023-10-12 ASSESSMENT — ASTHMA QUESTIONNAIRES: ACT_TOTALSCORE: 25

## 2023-10-12 NOTE — PROGRESS NOTES
1. Hypothyroidism due to Hashimoto's thyroiditis  > will recheck TSH in the next 6-8 weeks to assess response to the 50mcg of synthroid   - Primary Care Referral  - TSH with free T4 reflex; Future  > endocrine recommendations are belwo:     -recheck TFTs to assess dose  -continue 50mcg lt4 unless change indicated by labs               -TSH every 6-12 months (additional checks if needed due to new symptoms)               -increase dose to next levothyroxine dose if TSH trends above upper limit               -doses will likely escalate over time as more of his gland gets burned out but this is unlikely to happen suddenly and would be more likely to happen gradually over years               -recheck TSH 2-3 months after any levothyroxine dose change    2. Healthcare maintenance  - Primary Care Referral    3. Screening for HIV (human immunodeficiency virus)  4. Need for hepatitis C screening test  > low risk no history of IVDU or multiple sexual partners   - patient wishes to defer screening for a later visit     Houston Shaffer is a 22 year old, presenting for the following health issues:  Establish Care      10/12/2023     2:16 PM   Additional Questions   Roomed by Beverly HERNANDEZ       History of Present Illness       Reason for visit:  Establish primary care    He eats 0-1 servings of fruits and vegetables daily.He consumes 0 sweetened beverage(s) daily.He exercises with enough effort to increase his heart rate 60 or more minutes per day.  He exercises with enough effort to increase his heart rate 5 days per week.   He is taking medications regularly.     Hypothyroidism Follow-up    Since last visit, patient describes the following symptoms: Weight stable, no hair loss, no skin changes, no constipation, no loose stools  Denies any compression symptoms, no difficulty breathing, eating, talking   No one has told him that he snores loudly       Review of Systems   Constitutional:  Negative for fever.   HENT:  Negative for  ear pain and sinus pain.    Eyes:  Negative for pain.   Respiratory:  Negative for cough.    Cardiovascular:  Negative for chest pain.   Gastrointestinal:  Negative for abdominal pain.   Genitourinary:  Negative for dysuria.   Musculoskeletal:  Negative for neck pain.   Skin:  Negative for rash.   Neurological:  Negative for headaches.      As above       Objective    There were no vitals taken for this visit.  There is no height or weight on file to calculate BMI.  Physical Exam  Constitutional:       General: He is not in acute distress.  HENT:      Head: Normocephalic and atraumatic.      Right Ear: External ear normal.      Left Ear: External ear normal.      Mouth/Throat:      Mouth: Mucous membranes are moist.      Pharynx: Oropharynx is clear. No oropharyngeal exudate or posterior oropharyngeal erythema.   Eyes:      Extraocular Movements: Extraocular movements intact.   Neck:      Comments: No noted thyromegaly   Cardiovascular:      Rate and Rhythm: Normal rate and regular rhythm.      Heart sounds: Normal heart sounds.   Pulmonary:      Effort: Pulmonary effort is normal. No respiratory distress.      Breath sounds: Normal breath sounds. No wheezing or rhonchi.   Abdominal:      Palpations: Abdomen is soft. There is no mass.      Tenderness: There is no abdominal tenderness.   Musculoskeletal:         General: No deformity. Normal range of motion.      Cervical back: Normal range of motion and neck supple. No tenderness.   Skin:     General: Skin is warm.      Findings: No rash.   Neurological:      General: No focal deficit present.      Mental Status: He is alert and oriented to person, place, and time.   Psychiatric:         Mood and Affect: Mood normal.

## 2023-10-12 NOTE — PATIENT INSTRUCTIONS
Hello! It was a pleasure seeing you today. Just some things we discussed at today's visit:     Vitamin D Deficiency     Ok to take Vit D3 supplements 0934-0183 units daily   Hashimoto's   Please make an appointment with lab for a repeat blood draw on 10/26 or later       Sincerely,     Ayana Shetty MD

## 2023-10-14 NOTE — COMMUNITY RESOURCES LIST (ENGLISH)
10/14/2023   [facility]  N/A  For questions about this resource list or additional care needs, please contact your primary care clinic or care manager.  Phone: [phone]   Email: N/A   Address: [address] [place] [address]   Hours: N/A        Hotlines and Helplines       Hotline - Housing crisis  1  [facility] Housing Resource Line Distance: 19.82 miles      Phone/Virtual   [address] [place] [address]  Language: English  Hours: Mon - Sun Open 24 Hours   Phone: (844) 255-2403 Website: [email or web address]          Housing       Coordinated Entry access point  2  [facility]  Office - [facility] Distance: 18.49 miles      Phone/Virtual   [address] [place] [address]  Language: English  Hours: Mon - Fri 8:30 AM - 12:00 PM , Mon - Fri 1:00 PM - 4:00 PM  Fees: Free   Phone: (820) 126-7255 Ext.2 Email: [email or web address] Website: [email or web address]     Drop-in center or day shelter  3  [employer] Distance: 19.52 miles      In-Person   [address] [place] [address]  Language: English  Hours: Mon 11:00 AM - 7:00 PM , Tue 11:00 PM - 7:00 PM , Wed 1:00 AM - 7:00 PM , Thu 11:00 AM - 7:00 PM , Fri 11:00 AM - 5:00 PM  Fees: Free   Phone: (413) 748-6521 Email: info@edks4aezlwfn.org Website: http://yhwk4wjkhdfz.org     Housing search assistance  4  [facility] [employer] ([employer]) - [employer] Distance: 18.48 miles      In-Person, Phone/Virtual   [address] [place] [address]  Language: English  Hours: Mon - Fri 8:00 AM - 4:30 PM  Fees: Free   Phone: (104) 658-8801 Email: accap@accap.org Website: http://www.accap.org     5  [employer] Distance: 19.52 miles      In-Person   [address] [place] [address]  Language: English  Hours: Mon 11:00 AM - 7:00 PM , Tue 11:00 PM - 7:00 PM , Wed 1:00 AM - 7:00 PM , Thu 11:00 AM - 7:00 PM ,  Fri 11:00 AM - 5:00 PM  Fees: Free   Phone: (570) 594-3412 Ext.106 Email: info@[address].org Website: http://ytms5ssbsulz.org     Shelter for families  6  [facility] Distance: 14.75 miles      In-Person   [address] [place] [address]  Language: English  Hours: Mon - Fri 3:00 PM - 9:00 AM , Sat - Sun Open 24 Hours  Fees: Free   Phone: (922) 396-9296 Ext.1 Website: https://[email or web address]/[email or web address]-family-shelter/          Important Numbers & Websites       Emergency Services   [address]  [place] [address]  Poison Control   (945) 559-2297  Suicide Prevention Lifeline   (529) 162-3198 (TALK)  Child Abuse Hotline   (121) 407-8183 (4-A-Child)  Sexual Assault Hotline   (725) 701-2334 (HOPE)  National Runaway Safeline   (597) 132-6021 (RUNAWAY)  All-Options Talkline   (456) 327-6892  Substance Abuse Referral   (946) 876-7662 (HELP)

## 2023-10-26 ENCOUNTER — LAB (OUTPATIENT)
Dept: LAB | Facility: CLINIC | Age: 23
End: 2023-10-26
Payer: COMMERCIAL

## 2023-10-26 DIAGNOSIS — E06.3 HYPOTHYROIDISM DUE TO HASHIMOTO'S THYROIDITIS: ICD-10-CM

## 2023-10-26 LAB — TSH SERPL DL<=0.005 MIU/L-ACNC: 3.02 UIU/ML (ref 0.3–4.2)

## 2023-10-26 PROCEDURE — 36415 COLL VENOUS BLD VENIPUNCTURE: CPT

## 2023-10-26 PROCEDURE — 84443 ASSAY THYROID STIM HORMONE: CPT

## 2024-03-29 ENCOUNTER — OFFICE VISIT (OUTPATIENT)
Dept: FAMILY MEDICINE | Facility: CLINIC | Age: 24
End: 2024-03-29
Payer: COMMERCIAL

## 2024-03-29 VITALS
RESPIRATION RATE: 18 BRPM | WEIGHT: 185.2 LBS | TEMPERATURE: 98.2 F | HEART RATE: 62 BPM | BODY MASS INDEX: 27.43 KG/M2 | HEIGHT: 69 IN | DIASTOLIC BLOOD PRESSURE: 73 MMHG | SYSTOLIC BLOOD PRESSURE: 119 MMHG | OXYGEN SATURATION: 98 %

## 2024-03-29 DIAGNOSIS — Z11.4 SCREENING FOR HUMAN IMMUNODEFICIENCY VIRUS: ICD-10-CM

## 2024-03-29 DIAGNOSIS — E06.3 HYPOTHYROIDISM DUE TO HASHIMOTO'S THYROIDITIS: ICD-10-CM

## 2024-03-29 DIAGNOSIS — Z11.59 NEED FOR HEPATITIS C SCREENING TEST: Primary | ICD-10-CM

## 2024-03-29 LAB
HBA1C MFR BLD: 5.4 % (ref 0–5.6)
HCV AB SERPL QL IA: NONREACTIVE
HIV 1+2 AB+HIV1 P24 AG SERPL QL IA: NONREACTIVE
TSH SERPL DL<=0.005 MIU/L-ACNC: 3.28 UIU/ML (ref 0.3–4.2)

## 2024-03-29 PROCEDURE — 83036 HEMOGLOBIN GLYCOSYLATED A1C: CPT | Performed by: STUDENT IN AN ORGANIZED HEALTH CARE EDUCATION/TRAINING PROGRAM

## 2024-03-29 PROCEDURE — 84443 ASSAY THYROID STIM HORMONE: CPT | Performed by: STUDENT IN AN ORGANIZED HEALTH CARE EDUCATION/TRAINING PROGRAM

## 2024-03-29 PROCEDURE — 86803 HEPATITIS C AB TEST: CPT | Performed by: STUDENT IN AN ORGANIZED HEALTH CARE EDUCATION/TRAINING PROGRAM

## 2024-03-29 PROCEDURE — 99213 OFFICE O/P EST LOW 20 MIN: CPT | Performed by: STUDENT IN AN ORGANIZED HEALTH CARE EDUCATION/TRAINING PROGRAM

## 2024-03-29 PROCEDURE — 36415 COLL VENOUS BLD VENIPUNCTURE: CPT | Performed by: STUDENT IN AN ORGANIZED HEALTH CARE EDUCATION/TRAINING PROGRAM

## 2024-03-29 PROCEDURE — 87389 HIV-1 AG W/HIV-1&-2 AB AG IA: CPT | Performed by: STUDENT IN AN ORGANIZED HEALTH CARE EDUCATION/TRAINING PROGRAM

## 2024-03-29 ASSESSMENT — PAIN SCALES - GENERAL: PAINLEVEL: NO PAIN (0)

## 2024-03-29 NOTE — PROGRESS NOTES
Assessment & Plan     Hypothyroidism due to Hashimoto's thyroiditis  - currently on 50mcg synthroid, denies symptoms assoc with hypothyroidism   - TSH with free T4 reflex; Future  - Hemoglobin A1c; Future  - TSH with free T4 reflex  - Hemoglobin A1c    Need for hepatitis C screening test  - Hepatitis C Screen Reflex to HCV RNA Quant and Genotype; Future  - Hepatitis C Screen Reflex to HCV RNA Quant and Genotype    Screening for human immunodeficiency virus  - HIV Antigen Antibody Combo; Future  - HIV Antigen Antibody Combo    Follow-up plan:  -Titrate Synthroid based on results of TSH if needed    Houston Shaffer is a 23 year old, presenting for the following health issues:  Thyroid Problem, Recheck Medication, and Health Maintenance (Declines vaccinations today)        3/29/2024     7:21 AM   Additional Questions   Roomed by Dai HODGE LPN   Accompanied by self         3/29/2024     7:21 AM   Patient Reported Additional Medications   Patient reports taking the following new medications no new meds     History of Present Illness       Hypothyroidism:     Since last visit, patient describes the following symptoms::  None    He eats 0-1 servings of fruits and vegetables daily.He consumes 0 sweetened beverage(s) daily.He exercises with enough effort to increase his heart rate 60 or more minutes per day.  He exercises with enough effort to increase his heart rate 5 days per week.   He is taking medications regularly.     Denies any of the following unless in bold:   dry skin  cold sensitivity  fatigue  muscle cramps  voice changes  constipation  lethargy  weight gain  slow mental activity with poor memory  apathy  hearing loss (middle ear myxedema)  decreased sex drive     Review of Systems   Constitutional:  Negative for chills and fever.   HENT:  Negative for ear pain.    Eyes:  Negative for pain.   Respiratory:  Negative for cough.    Cardiovascular:  Negative for chest pain.   Gastrointestinal:  Negative for  "abdominal pain.   Genitourinary:  Negative for dysuria.   Musculoskeletal:  Negative for neck pain.   Skin:  Negative for rash.   Neurological:  Negative for headaches.           Objective    /73 (BP Location: Right arm, Patient Position: Sitting, Cuff Size: Adult Regular)   Pulse 62   Temp 98.2  F (36.8  C) (Tympanic)   Resp 18   Ht 1.757 m (5' 9.17\")   Wt 84 kg (185 lb 3.2 oz)   SpO2 98%   BMI 27.21 kg/m    Body mass index is 27.21 kg/m .  Physical Exam  Constitutional:       General: He is not in acute distress.  HENT:      Head: Normocephalic and atraumatic.      Right Ear: External ear normal.      Left Ear: External ear normal.      Mouth/Throat:      Mouth: Mucous membranes are moist.      Pharynx: Oropharynx is clear. No oropharyngeal exudate or posterior oropharyngeal erythema.   Eyes:      Extraocular Movements: Extraocular movements intact.   Neck:      Comments: No thyroid enlargement appreciated  Cardiovascular:      Rate and Rhythm: Normal rate and regular rhythm.      Heart sounds: Normal heart sounds.   Pulmonary:      Effort: Pulmonary effort is normal. No respiratory distress.      Breath sounds: Normal breath sounds. No wheezing or rhonchi.   Abdominal:      Palpations: Abdomen is soft. There is no mass.      Tenderness: There is no abdominal tenderness.   Musculoskeletal:         General: No deformity. Normal range of motion.      Cervical back: Normal range of motion and neck supple.   Skin:     General: Skin is warm.      Findings: No rash.   Neurological:      General: No focal deficit present.      Mental Status: He is alert and oriented to person, place, and time.   Psychiatric:         Mood and Affect: Mood normal.                    Signed Electronically by: NOLBERTO VALLADARES MD    "

## 2024-03-29 NOTE — RESULT ENCOUNTER NOTE
David Beasley,     It is a pleasure providing you with medical care. I have received and reviewed your results, and have the following recommendations:     Good news your TSH is staying stable, ok to continue with your current dose of synthroid. Your A1c is also within normal limits indicating you are not suspected to have prediabetes or diabetes.        Sincerely,     Ayana Shetty MD

## 2024-03-30 NOTE — RESULT ENCOUNTER NOTE
David Beasley,     It is a pleasure providing you with medical care. I have received and reviewed your results, and have the following recommendations:     Tested negative for HIV and Hep C.       Sincerely,     Ayana Shetty MD

## 2024-04-16 ENCOUNTER — HOSPITAL ENCOUNTER (EMERGENCY)
Facility: CLINIC | Age: 24
Discharge: HOME | End: 2024-04-16
Attending: PHYSICIAN ASSISTANT | Admitting: PHYSICIAN ASSISTANT
Payer: COMMERCIAL

## 2024-04-16 VITALS
DIASTOLIC BLOOD PRESSURE: 63 MMHG | RESPIRATION RATE: 16 BRPM | HEART RATE: 74 BPM | TEMPERATURE: 98.1 F | SYSTOLIC BLOOD PRESSURE: 135 MMHG | OXYGEN SATURATION: 98 %

## 2024-04-16 DIAGNOSIS — S06.0X0A CONCUSSION WITHOUT LOSS OF CONSCIOUSNESS, INITIAL ENCOUNTER: ICD-10-CM

## 2024-04-16 DIAGNOSIS — S02.5XXA CHIPPED TOOTH: ICD-10-CM

## 2024-04-16 PROCEDURE — G0463 HOSPITAL OUTPT CLINIC VISIT: HCPCS | Performed by: PHYSICIAN ASSISTANT

## 2024-04-16 PROCEDURE — 99213 OFFICE O/P EST LOW 20 MIN: CPT | Performed by: PHYSICIAN ASSISTANT

## 2024-04-16 ASSESSMENT — COLUMBIA-SUICIDE SEVERITY RATING SCALE - C-SSRS
6. HAVE YOU EVER DONE ANYTHING, STARTED TO DO ANYTHING, OR PREPARED TO DO ANYTHING TO END YOUR LIFE?: NO
1. IN THE PAST MONTH, HAVE YOU WISHED YOU WERE DEAD OR WISHED YOU COULD GO TO SLEEP AND NOT WAKE UP?: NO
2. HAVE YOU ACTUALLY HAD ANY THOUGHTS OF KILLING YOURSELF IN THE PAST MONTH?: NO

## 2024-04-16 ASSESSMENT — ACTIVITIES OF DAILY LIVING (ADL): ADLS_ACUITY_SCORE: 35

## 2024-04-16 NOTE — Clinical Note
Edmund Beasley was seen and treated in our emergency department on 4/16/2024.    I recommend he rest with limited activity only as tolerated by headache for the next 7 days until his next follow-up appointment.  During this time he should avoid strenuous physical activity, periods of prolonged concentration, bright lights, loud noisesor any other activities which exacerbate his headache or neck pain.      Sincerely,     [facility] Emergency Dept

## 2024-04-16 NOTE — ED PROVIDER NOTES
History   No chief complaint on file.    CATHRYN Beasley is a 23 year old male who presents urgent care with concern over head injury/possible concussion.  Three days prior to arrival patient was in [place] attempting a back flip when someone hit his leg resulting in not maintaining rotation and falling hitting his head.  He denies any loss of consciousness but states that he did feel dazed/stunned for several seconds.  Was immediately able to get up.   he sustained abrasions to the crown of his head initially with some bleeding and chipped tooth.  Since injury he has complained of headache, intermittent lightheadedness, fatigue and neck pain which is exacerbated by extension of his neck.  He denies any concerns for jaw malocclusion.  No fever, chills, myalgias, vision changes, phototopia, nausea, vomiting, abdominal pain.  He has not attempted any OTC medications consistently.     Allergies:  No Known Allergies    Problem List:    Patient Active Problem List    Diagnosis Date Noted    Hypothyroidism due to Hashimoto's thyroiditis 03/29/2023     Priority: Medium    Intermittent asthma 08/07/2014     Priority: Medium     Was more persistent in fall/winter 2013 - used Symbicort and needed oral steroids for a couple of months       AR (allergic rhinitis) 11/19/2013     Priority: Medium      Past Medical History:    Past Medical History:   Diagnosis Date    Moderate persistent asthma 11/19/2013    Unspecified asthma(493.90)      Past Surgical History:    No past surgical history on file.    Family History:    No family history on file.    Social History:  Marital Status:  Single [1]  Social History     Tobacco Use    Smoking status: Never    Smokeless tobacco: Never   Vaping Use    Vaping status: Never Used   Substance Use Topics    Alcohol use: Yes     Comment: occasional    Drug use: No        Medications:    levothyroxine (SYNTHROID/LEVOTHROID) 50 MCG tablet  vitamin D3 (CHOLECALCIFEROL) 250 mcg (45556 units)  capsule      Review of Systems   Constitutional:  Negative for chills and fever.   Eyes:  Negative for photophobia and visual disturbance.   Respiratory:  Negative for cough and shortness of breath.    Cardiovascular:  Negative for chest pain and palpitations.   Gastrointestinal:  Negative for abdominal pain, diarrhea, nausea and vomiting.   Skin:  Positive for wound. Negative for color change and rash.   Neurological:  Positive for headaches. Negative for dizziness, seizures, syncope, weakness and light-headedness.     Physical Exam   BP: 135/63  Pulse: 74  Temp: 98.1  F (36.7  C)  Resp: 16  SpO2: 98 %  Physical Exam  GENERAL APPEARANCE: alert, cooperative and no acute distress  EYES: EOMI,  PERRL, conjunctiva clear  HENT: Normocephalic.  Patient has tolerated general superficial abrasion to the crown of his scalp, measuring approximately 6 cm in diameter.  Ear canals and TM's normal.  No adma sign, hemotympanum, Nose and mouth without ulcers, erythema or lesions.  Small chip to left lateral upper incisor,  uvula and soft palate rise symmetric with phonation, tongue protrudes to midline, equal motor and sensory function of the face bilaterally  NECK: supple, full ROM with pain with extension, no focal bony midline tenderness, 5/5 strength against resistance with rotation to right/left and with shoulder shrug.    RESP: lungs clear to auscultation - no rales, rhonchi or wheezes  CV: regular rates and rhythm, normal S1 S2, no murmur noted  NEURO: Normal strength and tone, sensory exam grossly normal,  normal speech and mentation, cranial nerves II through XII grossly intact.  Normal finger-nose-finger testing.  No dysdiadochokinesis.  Reflexes upper lower extremities equal bilaterally  SKIN: no suspicious lesions or rashes  ED Course        Procedures       Critical Care time:  none        No results found for any visits on 04/16/24.  Medications - No data to display    Assessments & Plan (with Medical Decision  Making)     I have reviewed the nursing notes.  I have reviewed the findings, diagnosis, plan and need for follow up with the patient.     New Prescriptions    No medications on file     Final diagnoses:   Concussion without loss of consciousness, initial encounter   Chipped tooth     23-year-old male presents urgent care for evaluation of headache following injury 3 days ago when he hit his head on the ground performing a back flip.  He had stable vital signs upon arrival.  Physical exam findings significant for abrasion to his scalp, check to his left upper lateral central incisor.  Focal neurologic exam was within normal limits.  Symptoms of headache, intermittent lightheadedness and fatiuge and consistent with concussion.  Based on duration of time since injury I do not suspect subdural, epidural abscess or other concerning intracranial  injury and patient agreed to defer imaging with CT at this time.  He was discharged home stable with instructions for symptomatic treatment with rest, ice, Tylenol/ibuprofen.  Follow up for recheck prior to returning to physical activity, concussion  referral placed. Worrisome reasons to return to ER/UC sooner discussed.     Disclaimer: This note consists of symbols derived from keyboarding, dictation, and/or voice recognition software. As a result, there may be errors in the script that have gone undetected.  Please consider this when interpreting information found in the chart.    4/16/2024   [facility] EMERGENCY DEPT       Sruthi Batres PA-C  04/21/24 4002

## 2024-04-16 NOTE — ED TRIAGE NOTES
Pt present with concussion concern - fell and hit head on wood floor Saturday night denies LOC and no vomiting.  Headaches.

## 2024-04-21 ASSESSMENT — ENCOUNTER SYMPTOMS
WOUND: 1
NAUSEA: 0
CHILLS: 0
SEIZURES: 0
SHORTNESS OF BREATH: 0
COLOR CHANGE: 0
PALPITATIONS: 0
PHOTOPHOBIA: 0
COUGH: 0
FEVER: 0
DIZZINESS: 0
WEAKNESS: 0
LIGHT-HEADEDNESS: 0
DIARRHEA: 0
VOMITING: 0
ABDOMINAL PAIN: 0
HEADACHES: 1

## 2024-06-22 ENCOUNTER — HEALTH MAINTENANCE LETTER (OUTPATIENT)
Age: 24
End: 2024-06-22

## 2024-09-27 DIAGNOSIS — E06.3 HYPOTHYROIDISM DUE TO HASHIMOTO'S THYROIDITIS: ICD-10-CM

## 2024-10-09 ENCOUNTER — MYC REFILL (OUTPATIENT)
Dept: ENDOCRINOLOGY | Facility: CLINIC | Age: 24
End: 2024-10-09
Payer: COMMERCIAL

## 2024-10-09 DIAGNOSIS — E06.3 HYPOTHYROIDISM DUE TO HASHIMOTO'S THYROIDITIS: ICD-10-CM

## 2024-10-10 ENCOUNTER — MYC MEDICAL ADVICE (OUTPATIENT)
Dept: UROLOGY | Facility: CLINIC | Age: 24
End: 2024-10-10
Payer: COMMERCIAL

## 2024-10-10 RX ORDER — LEVOTHYROXINE SODIUM 50 UG/1
50 TABLET ORAL DAILY
Qty: 90 TABLET | Refills: 2 | Status: SHIPPED | OUTPATIENT
Start: 2024-10-10

## 2024-10-10 NOTE — TELEPHONE ENCOUNTER
[employer] message sent:   Dr. Chelo Shaffer is no longer with [facility]. We cannot refill your Synthroid until you establish care with a new endocrinologist. Please call [phone] to make an appointment with a new provider.     Once you have made an appointment with a new provider, you can send your refill request to them. If you need an emergency refill please let us know.     Thank you for choosing [facility].    April Nath RN on 10/10/2024 at 8:27 AM

## 2024-10-10 NOTE — TELEPHONE ENCOUNTER
Patient made an appt with Dr Stern for May 19, 2025. He will need an emergency refill sent to his pharmacy.

## 2024-12-09 ENCOUNTER — TELEPHONE (OUTPATIENT)
Dept: FAMILY MEDICINE | Facility: CLINIC | Age: 24
End: 2024-12-09
Payer: COMMERCIAL

## 2024-12-09 NOTE — TELEPHONE ENCOUNTER
Patient Quality Outreach    Patient is due for the following:   Asthma  -  ACT needed  Physical Preventive Adult Physical    Action(s) Taken:   Schedule a Adult Preventative  Patient was assigned appropriate questionnaire to complete    Type of outreach:    Sent [employer] message.  Will postpone x 1 week, if not viewed or completed please mail ACT.       Questions for provider review:    None           Carina Blake, [facility]

## 2024-12-09 NOTE — LETTER
December 16, 2024      Edmund Beasley  [address] [address] [address]        Dear Edmund,     Your healthcare team cares about your health. To provide you with the best care, we have reviewed your chart and based on our findings, we see that you are due for:   Annual preventative exam.   An Asthma Control Test (ACT) that our clinic uses to monitor and manage your asthma. This test is an assessment tool that we use to determine how well your asthma is controlled.  Please complete the enclosed form and return in the provided envelope.  Thank you for choosing [facility] for your healthcare needs. For any questions, concerns, or to schedule an appointment please contact the clinic.   Healthy Regards,    Your [facility] Care Team

## 2024-12-16 NOTE — TELEPHONE ENCOUNTER
[employer] message has not been viewed, letter mailed with recommendations and ACT to complete.

## 2024-12-26 RX ORDER — LEVOTHYROXINE SODIUM 50 UG/1
50 TABLET ORAL DAILY
Qty: 90 TABLET | Refills: 4 | OUTPATIENT
Start: 2024-12-26

## 2025-03-18 ENCOUNTER — TELEPHONE (OUTPATIENT)
Dept: FAMILY MEDICINE | Facility: CLINIC | Age: 25
End: 2025-03-18
Payer: COMMERCIAL

## 2025-03-18 NOTE — LETTER
March 25, 2025      Edmund Beasley  [address] [address] [address]        Dear Edmund,     Your team at [facility] cares about your health. We have reviewed your chart and based on our findings; we are making the following recommendations to better manage your health.     You are in particular need of attention regarding the following:     Asthma Control Test     This screening tool helps us to assess how well your asthma is controlled.Good asthma control leads to fewer asthma symptoms and greater health. If your asthma is not in good control (score is 19 or less) or you have been to the ER or urgent care for your asthma, it is recommended you be seen by your provider for medication and lifestyle adjustments.      Please complete and return the attached Asthma Control Test respond below with you answers for each question: Adult ACT     This is valuable information that is requested by your Care Team.    PREVENTATIVE VISIT: Physical    If you have already completed these items, please contact the clinic via phone or   MyChart so your care team can review and update your records. Thank you for   choosing [facility] for your healthcare needs. For any questions,   concerns, or to schedule an appointment please contact our clinic.    Healthy Regards,      Your [facility] Care Team

## 2025-03-18 NOTE — TELEPHONE ENCOUNTER
Patient Quality Outreach    Patient is due for the following:   Asthma  -  ACT needed  Physical Preventive Adult Physical    Action(s) Taken:   Schedule a Adult Preventative  Patient was assigned appropriate questionnaire to complete    Type of outreach:    Sent [email or web address] message.  Will postpone x 1 week, if not viewed or completed please mail ACT.       Questions for provider review:    None           Carina Blake, [facility]

## 2025-03-25 NOTE — TELEPHONE ENCOUNTER
Patient read [employer] message with no response. Sending panel letter and ACT in the mail.    Daija Roe MA

## 2025-05-19 ENCOUNTER — OFFICE VISIT (OUTPATIENT)
Dept: ENDOCRINOLOGY | Facility: CLINIC | Age: 25
End: 2025-05-19
Payer: COMMERCIAL

## 2025-05-19 ENCOUNTER — RESULTS FOLLOW-UP (OUTPATIENT)
Dept: ENDOCRINOLOGY | Facility: CLINIC | Age: 25
End: 2025-05-19

## 2025-05-19 VITALS
DIASTOLIC BLOOD PRESSURE: 77 MMHG | SYSTOLIC BLOOD PRESSURE: 110 MMHG | BODY MASS INDEX: 26.89 KG/M2 | HEART RATE: 64 BPM | WEIGHT: 183 LBS | RESPIRATION RATE: 16 BRPM | OXYGEN SATURATION: 98 %

## 2025-05-19 DIAGNOSIS — E06.3 HYPOTHYROIDISM DUE TO HASHIMOTO'S THYROIDITIS: ICD-10-CM

## 2025-05-19 DIAGNOSIS — E06.3 HYPOTHYROIDISM DUE TO HASHIMOTO'S THYROIDITIS: Primary | ICD-10-CM

## 2025-05-19 LAB
T4 FREE SERPL-MCNC: 1.63 NG/DL (ref 0.9–1.7)
TSH SERPL DL<=0.005 MIU/L-ACNC: 1.16 UIU/ML (ref 0.3–4.2)

## 2025-05-19 PROCEDURE — 99214 OFFICE O/P EST MOD 30 MIN: CPT | Performed by: INTERNAL MEDICINE

## 2025-05-19 PROCEDURE — 36415 COLL VENOUS BLD VENIPUNCTURE: CPT | Performed by: INTERNAL MEDICINE

## 2025-05-19 PROCEDURE — 3074F SYST BP LT 130 MM HG: CPT | Performed by: INTERNAL MEDICINE

## 2025-05-19 PROCEDURE — 84443 ASSAY THYROID STIM HORMONE: CPT | Performed by: INTERNAL MEDICINE

## 2025-05-19 PROCEDURE — 3078F DIAST BP <80 MM HG: CPT | Performed by: INTERNAL MEDICINE

## 2025-05-19 PROCEDURE — 84439 ASSAY OF FREE THYROXINE: CPT | Performed by: INTERNAL MEDICINE

## 2025-05-19 RX ORDER — LEVOTHYROXINE SODIUM 50 UG/1
50 TABLET ORAL DAILY
Qty: 90 TABLET | Refills: 3 | Status: SHIPPED | OUTPATIENT
Start: 2025-05-19

## 2025-05-19 NOTE — NURSING NOTE
Edmund Beasley's goals for this visit include:   Chief Complaint   Patient presents with    New Patient    Thyroid Problem       He requests these members of his care team be copied on today's visit information: yes    PCP: Ayana Shetty    Referring Provider:  Referred Self, MD  No address on file    /77   Pulse 64   Resp 16   Wt 83 kg (183 lb)   SpO2 98%   BMI 26.89 kg/m      Do you need any medication refills at today's visit? Yes    Jac Turcios, EMT

## 2025-05-19 NOTE — PATIENT INSTRUCTIONS
Lab today  Continue with levothyroxine 50 mcg daily    See Primary doctor in 1 year during annual physical    If you have any questions, please do not hesitate to call [facility] at [phone] and ask for Endocrinology clinic.    Sincerely,    Jarred Goodwin MD  Endocrinology          Welcome to the [facility] Endocrinology and Diabetes Clinics     Our Endocrinology Clinics are here to provide you with a team-based, collaborative approach in the diagnosis and treatment of patients with diabetes and endocrine disorders. The team is made up of Physicians, Physician Assistants, Certified Diabetes Educators, Registered Nurses, Medical Assistants, Emergency Medical Technicians, and many others, all of whom have the unified goal of providing our patients with high quality care.     Please see below for some helpful tips to best navigate and use the [facility] Endocrinology clinic:     [place] Respect: At [facility], we are committed to a respectful and safe space for all patients, visitors, and staff.  We believe that mutual respect between patients and their care team is the foundation of quality care.  It is our expectation that you will be treated with respect by your care team.  In turn, we ask that all communication with the care team (written and verbal) be respectful and free from profanity, threatening, or abusive language.  Disrespectful communication undermines our therapeutic relationship with you and may result in us being unable to continue to provide your care.    Refills: A provider must see you at least annually to prescribe and refill medications. This is to ensure your safety as well as meet insurance and compliance regulations.    Scheduling: Many of our Providers offer both in-person or video visits. Please call to schedule any needed follow ups as soon as possible because our provider schedules fill up very quickly. Our care team has the  right to require an in-person visit when they believe that it is medically necessary. Please remember that for any virtual visits, you must be in the state of [place] at the time of the visit, otherwise we are unable to see you and you will need to be rescheduled.    Missed Appointments: If you need to cancel or miss your scheduled appointment, please call the clinic at [phone] to reschedule.  Please note if you repeatedly miss appointments or repeatedly miss appointments without calling to inform us ahead of time (no-show), the clinic may elect to not allow you to reschedule without speaking to a manager, may require a Partnership In Care Agreement prior to rescheduling, or could result in you no longer being able to receive care from the clinic. Providing the clinic with timely notification if you have to miss an appointment, allows us to better serve the needs of all of our patients.    Primary Care Provider: Our Endocrinologists are Specialists in their field. We expect you to have a Primary Care Provider established to handle any needs outside of your diabetes and endocrine care.  We would be happy to assist you find a Primary Care Provider, if you do not have one.    [employer]: [employer] is a wonderful resource that allows you access to your Care Team via online or the jeffery. Please ask a member of the team if you would like help creating an account. Please note that it may take up to 2 business days for a response. [employer] messages are not reviewed on weekends or after business hours.  Emergent or urgent care needs should never be communicated via [employer].  If you experience a medical emergency call 911 or go to the nearest emergency room.    Labs: It is recommended that you stay within the [facility] for labs but you are welcome to obtain ordered labs (with some exceptions) from any location of your choice as long as they are able to complete and process the needed labs. If you need us to fax  orders to your preferred lab, please provide us the name and fax number of the lab you would like to go to so we can fax the orders. If your labs are drawn outside of the [facility], please have them fax the results to [phone] ([place]) or [phone] ([place]) or via [facility]. It is your responsibility to ensure that outside lab results are sent to us.    We look forward to working with you. Please do not hesitate to reach out with any questions.    Thank you,    The Endocrine Team    [facility] Address:   [place] Address:     [address] [place] [address]    Phone: [phone]  Fax: [phone]   [address] [place] [address]    Phone: [phone]  Fax: [phone]     [place] Cost Estimate Phone Number: [phone]    General Lab and Imaging Scheduling Phone Number: [phone]      If you are interested in exploring research opportunities in the Division of Diabetes, Endocrinology and Metabolism and within the [facility] you are welcome to view the following QR codes by scanning them using your phone's camera to access a link to more information.  Participating in a research study is voluntary. Your decision whether or not to participate will not affect your current or future relations with the [facility] or [facility]. Current available studies are:     Type 1 Diabetes  Adults     Pediatrics     Type 2 Diabetes  Weight Loss - People Treated with Diet or Metformin Only     Pediatrics and Young Adults

## 2025-05-19 NOTE — LETTER
5/19/2025      Edmund Beasley  [address]  [place] [address]      Dear Colleague,    Thank you for referring your patient, Edmnud Beasley, to the [facility]. Please see a copy of my visit note below.         Endocrinology Note         Edmund is a 24 year old male presents today for hypothyroidism     HPI  Edmund is a 24 year old male presents today for hypothyroidism due to Hashimoto's thyroiditis     Patient was diagnosed with hypothyroidism in 2019 when TSH was 9 and free T4 was 0.7.  He had positive TPO antibody and thyroglobulin antibody.  He was started on levothyroxine since then  He has been on 50 mcg levothyroxine daily.  He takes it on empty stomach.  He does not usually miss the dose.  If he missed a dose, he usually take 2 pills the next day.    He has no concerns.  He has good energy.  He denies any palpitation, shortness of breath, altered bowel movement, insomnia.  He usually feels hot.  Last TSH was 3.2 in March 2024.    He denies family history of thyroid disease or type 1 diabetes.    Past Medical History  Past Medical History:   Diagnosis Date     Moderate persistent asthma 11/19/2013     Unspecified asthma(493.90)      Allergies  No Known Allergies    Medications  Current Outpatient Medications   Medication Sig Dispense Refill     levothyroxine (SYNTHROID/LEVOTHROID) 50 MCG tablet Take 1 tablet (50 mcg) by mouth daily. 90 tablet 1     vitamin D3 (CHOLECALCIFEROL) 250 mcg (46460 units) capsule Take 1 capsule by mouth daily       Family History  family history is not on file.    Social History  Social History     Tobacco Use     Smoking status: Never     Smokeless tobacco: Never   Vaping Use     Vaping status: Never Used   Substance Use Topics     Alcohol use: Yes     Comment: occasional     Drug use: No   No smoking,   Drink alcohol on the weekend.  Work in plumbing business    ROS  10 points ROS were negative otherwise mentioned in HPI    Physical Exam  /77    Pulse 64   Resp 16   Wt 83 kg (183 lb)   SpO2 98%   BMI 26.89 kg/m    Body mass index is 26.89 kg/m .  Constitutional: no distress, comfortable, pleasant   Eyes: anicteric, normal extra-ocular movements, no lid lag or retraction  Neck: no thyromegaly, no discrete nodule  Cardiovascular: regular rate and rhythm, normal S1 and S2, no murmurs  Respiratory: clear to auscultation, no wheezes or crackles, normal breath sounds   Gastrointestinal:  nontender, no hepatomegaly  Musculoskeletal: no edema   Skin: no concerning lesions, no jaundice   Neurological: cranial nerves intact, 1+ reflexes at patella, normal gait, no tremor on outstretched hands bilaterally  Psychological: appropriate mood       RESULTS  I have personally reviewed labs and images. I also reviewed labs with patient and discussed the result and plan of care.        ASSESSMENT:    Edmund is a 24 year old male presents today for hypothyroidism due to Hashimoto's thyroiditis     1) Hypothyroidism due to Hashimoto thyroiditis: Diagnosed 2019.  Has positive TPO and KATHRYN antibodies.  Been on stable dose of levothyroxine since.  He is clinically euthyroid with normal labs in March 2024    Advised him to continue taking levothyroxine and have thyroid test done annually    PLAN:   Check TSH and free T4 today and adjust dose accordingly  Patient can follow-up with primary care physician annually during physical exam for hypothyroidism    Jarred Goodwin MD  Division of Diabetes and Endocrinology  Department of Medicine      Again, thank you for allowing me to participate in the care of your patient.        Sincerely,        Jarred Goodwin MD    Electronically signed

## 2025-05-19 NOTE — PROGRESS NOTES
Endocrinology Note         Edmund is a 24 year old male presents today for hypothyroidism     HPI  Edmudn is a 24 year old male presents today for hypothyroidism due to Hashimoto's thyroiditis     Patient was diagnosed with hypothyroidism in 2019 when TSH was 9 and free T4 was 0.7.  He had positive TPO antibody and thyroglobulin antibody.  He was started on levothyroxine since then  He has been on 50 mcg levothyroxine daily.  He takes it on empty stomach.  He does not usually miss the dose.  If he missed a dose, he usually take 2 pills the next day.    He has no concerns.  He has good energy.  He denies any palpitation, shortness of breath, altered bowel movement, insomnia.  He usually feels hot.  Last TSH was 3.2 in March 2024.    He denies family history of thyroid disease or type 1 diabetes.    Past Medical History  Past Medical History:   Diagnosis Date    Moderate persistent asthma 11/19/2013    Unspecified asthma(493.90)      Allergies  No Known Allergies    Medications  Current Outpatient Medications   Medication Sig Dispense Refill    levothyroxine (SYNTHROID/LEVOTHROID) 50 MCG tablet Take 1 tablet (50 mcg) by mouth daily. 90 tablet 1    vitamin D3 (CHOLECALCIFEROL) 250 mcg (95452 units) capsule Take 1 capsule by mouth daily       Family History  family history is not on file.    Social History  Social History     Tobacco Use    Smoking status: Never    Smokeless tobacco: Never   Vaping Use    Vaping status: Never Used   Substance Use Topics    Alcohol use: Yes     Comment: occasional    Drug use: No   No smoking,   Drink alcohol on the weekend.  Work in plumbing business    ROS  10 points ROS were negative otherwise mentioned in HPI    Physical Exam  /77   Pulse 64   Resp 16   Wt 83 kg (183 lb)   SpO2 98%   BMI 26.89 kg/m    Body mass index is 26.89 kg/m .  Constitutional: no distress, comfortable, pleasant   Eyes: anicteric, normal extra-ocular movements, no lid lag or retraction  Neck: no  thyromegaly, no discrete nodule  Cardiovascular: regular rate and rhythm, normal S1 and S2, no murmurs  Respiratory: clear to auscultation, no wheezes or crackles, normal breath sounds   Gastrointestinal:  nontender, no hepatomegaly  Musculoskeletal: no edema   Skin: no concerning lesions, no jaundice   Neurological: cranial nerves intact, 1+ reflexes at patella, normal gait, no tremor on outstretched hands bilaterally  Psychological: appropriate mood       RESULTS  I have personally reviewed labs and images. I also reviewed labs with patient and discussed the result and plan of care.        ASSESSMENT:    Edmund is a 24 year old male presents today for hypothyroidism due to Hashimoto's thyroiditis     1) Hypothyroidism due to Hashimoto thyroiditis: Diagnosed 2019.  Has positive TPO and KATHRYN antibodies.  Been on stable dose of levothyroxine since.  He is clinically euthyroid with normal labs in March 2024    Advised him to continue taking levothyroxine and have thyroid test done annually    PLAN:   Check TSH and free T4 today and adjust dose accordingly  Patient can follow-up with primary care physician annually during physical exam for hypothyroidism    Jarred Goodwin MD  Division of Diabetes and Endocrinology  Department of Medicine

## 2025-07-11 DIAGNOSIS — E06.3 HYPOTHYROIDISM DUE TO HASHIMOTO'S THYROIDITIS: ICD-10-CM

## 2025-07-12 ENCOUNTER — HEALTH MAINTENANCE LETTER (OUTPATIENT)
Age: 25
End: 2025-07-12

## 2025-07-15 RX ORDER — LEVOTHYROXINE SODIUM 50 UG/1
50 TABLET ORAL DAILY
Qty: 90 TABLET | Refills: 3 | OUTPATIENT
Start: 2025-07-15

## 2025-07-15 NOTE — TELEPHONE ENCOUNTER
levothyroxine (SYNTHROID/LEVOTHROID) 50 MCG tablet 90 tablet 3 5/19/2025     Should have refills on file. Pharmacy  sent message. Rx refill denied    Maria Dolores Dove RN  P Red Flag Triage/MRT